# Patient Record
Sex: FEMALE | Race: WHITE | Employment: OTHER | URBAN - METROPOLITAN AREA
[De-identification: names, ages, dates, MRNs, and addresses within clinical notes are randomized per-mention and may not be internally consistent; named-entity substitution may affect disease eponyms.]

---

## 2020-10-01 ENCOUNTER — HOSPITAL ENCOUNTER (OUTPATIENT)
Dept: MAMMOGRAPHY | Age: 60
Discharge: HOME | End: 2020-10-01
Attending: NURSE PRACTITIONER
Payer: COMMERCIAL

## 2020-10-01 DIAGNOSIS — Z12.31 SCREENING MAMMOGRAM, ENCOUNTER FOR: ICD-10-CM

## 2020-10-01 PROCEDURE — 77067 SCR MAMMO BI INCL CAD: CPT

## 2021-04-27 ENCOUNTER — APPOINTMENT (RX ONLY)
Dept: URBAN - METROPOLITAN AREA CLINIC 349 | Facility: CLINIC | Age: 61
Setting detail: DERMATOLOGY
End: 2021-04-27

## 2021-04-27 DIAGNOSIS — Z71.89 OTHER SPECIFIED COUNSELING: ICD-10-CM

## 2021-04-27 DIAGNOSIS — L82.0 INFLAMED SEBORRHEIC KERATOSIS: ICD-10-CM

## 2021-04-27 DIAGNOSIS — L82.1 OTHER SEBORRHEIC KERATOSIS: ICD-10-CM

## 2021-04-27 DIAGNOSIS — Z85.828 PERSONAL HISTORY OF OTHER MALIGNANT NEOPLASM OF SKIN: ICD-10-CM

## 2021-04-27 PROBLEM — D48.5 NEOPLASM OF UNCERTAIN BEHAVIOR OF SKIN: Status: ACTIVE | Noted: 2021-04-27

## 2021-04-27 PROCEDURE — ? COUNSELING

## 2021-04-27 PROCEDURE — 11102 TANGNTL BX SKIN SINGLE LES: CPT

## 2021-04-27 PROCEDURE — ? PATHOLOGY BILLING

## 2021-04-27 PROCEDURE — 88305 TISSUE EXAM BY PATHOLOGIST: CPT

## 2021-04-27 PROCEDURE — A4550 SURGICAL TRAYS: HCPCS

## 2021-04-27 PROCEDURE — 99242 OFF/OP CONSLTJ NEW/EST SF 20: CPT | Mod: 25

## 2021-04-27 PROCEDURE — ? BIOPSY BY SHAVE METHOD

## 2021-04-27 PROCEDURE — ? REFERRAL CORRESPONDENCE

## 2021-04-27 ASSESSMENT — LOCATION ZONE DERM
LOCATION ZONE: FACE
LOCATION ZONE: TRUNK
LOCATION ZONE: FACE
LOCATION ZONE: TRUNK

## 2021-04-27 ASSESSMENT — LOCATION DETAILED DESCRIPTION DERM
LOCATION DETAILED: LEFT INFERIOR UPPER BACK
LOCATION DETAILED: RIGHT CENTRAL MALAR CHEEK
LOCATION DETAILED: SUPERIOR THORACIC SPINE
LOCATION DETAILED: RIGHT CENTRAL MALAR CHEEK

## 2021-04-27 ASSESSMENT — LOCATION SIMPLE DESCRIPTION DERM
LOCATION SIMPLE: RIGHT CHEEK
LOCATION SIMPLE: RIGHT CHEEK
LOCATION SIMPLE: LEFT UPPER BACK
LOCATION SIMPLE: UPPER BACK

## 2021-04-27 NOTE — PROCEDURE: PATHOLOGY BILLING
Immunohistochemistry ([id number] and [id number]) billing is not performed here. Please use the Immunohistochemistry Stain Billing plan to accomplish this. Immunohistochemistry ([id number] and [id number]) billing is not performed here. Please use the Immunohistochemistry Stain Billing plan to accomplish this.

## 2021-04-27 NOTE — PROCEDURE: BIOPSY BY SHAVE METHOD
Validate Triangulation: No
Anesthesia Type: 1% lidocaine with 1:100,000 epinephrine and a 1:10 solution of 8.4% sodium bicarbonate
Dressing: bandage
Curettage Text: The wound bed was treated with curettage after the biopsy was performed.
Depth Of Biopsy: dermis
Biopsy Method: Personna blade
Wound Care: Vaseline
Validate Note Data (See Information Below): Yes
Biopsy Type: H and E
Size Of Lesion In Cm: 0
Additional Anesthesia Volume In Cc (Will Not Render If 0): 1.5
Billing Type: Third-Party Bill
Electrodesiccation Text: The wound bed was treated with electrodesiccation after the biopsy was performed.
Detail Level: Detailed
Consent: Written consent was obtained and risks were reviewed including but not limited to scarring, infection, bleeding, scabbing, incomplete removal, nerve damage and allergy to anesthesia.
Accession #: [id number]
Notification Instructions: Patient will be notified of biopsy results. However, patient instructed to call the office if not contacted within 2 weeks. After the procedure, the patient was oriented to person, place, and time. Patient denied feeling dizzy, queasy, and and declined further observation after initial 5 minute observation time.
Silver Nitrate Text: The wound bed was treated with silver nitrate after the biopsy was performed.
Type Of Destruction Used: Curettage
Post-Care Instructions: I reviewed with the patient in detail post-care instructions. Patient is to keep the biopsy site dry overnight, and then apply Vaseline  daily until healed. Patient may apply hydrogen peroxide soaks to remove any crusting. After the procedure, the patient was oriented to person, place, and time. Patient denied feeling dizzy, queasy, and and declined further observation after initial 5 minute observation time.
Electrodesiccation And Curettage Text: The wound bed was treated with electrodesiccation and curettage after the biopsy was performed.
Cryotherapy Text: The wound bed was treated with cryotherapy after the biopsy was performed.
Hemostasis: Aluminum Chloride
Anesthesia Volume In Cc (Will Not Render If 0): 1
Information: Selecting Yes will display possible errors in your note based on the variables you have selected. This validation is only offered as a suggestion for you. PLEASE NOTE THAT THE VALIDATION TEXT WILL BE REMOVED WHEN YOU FINALIZE YOUR NOTE. IF YOU WANT TO FAX A PRELIMINARY NOTE YOU WILL NEED TO TOGGLE THIS TO 'NO' IF YOU DO NOT WANT IT IN YOUR FAXED NOTE.

## 2021-09-10 ENCOUNTER — TRANSCRIBE ORDER (OUTPATIENT)
Dept: SCHEDULING | Age: 61
End: 2021-09-10

## 2021-09-10 DIAGNOSIS — Z12.31 SCREENING MAMMOGRAM FOR HIGH-RISK PATIENT: Primary | ICD-10-CM

## 2021-10-06 ENCOUNTER — HOSPITAL ENCOUNTER (OUTPATIENT)
Dept: MAMMOGRAPHY | Age: 61
Discharge: HOME | End: 2021-10-06
Attending: NURSE PRACTITIONER
Payer: COMMERCIAL

## 2021-10-06 DIAGNOSIS — Z12.31 SCREENING MAMMOGRAM FOR HIGH-RISK PATIENT: ICD-10-CM

## 2021-10-06 PROCEDURE — 77063 BREAST TOMOSYNTHESIS BI: CPT

## 2022-03-31 PROBLEM — I10 PRIMARY HYPERTENSION: Status: ACTIVE | Noted: 2022-03-31

## 2022-03-31 PROBLEM — E78.5 HYPERLIPIDEMIA: Status: ACTIVE | Noted: 2022-03-31

## 2022-08-25 ENCOUNTER — TELEMEDICINE (OUTPATIENT)
Dept: FAMILY MEDICINE CLINIC | Facility: CLINIC | Age: 62
End: 2022-08-25
Payer: COMMERCIAL

## 2022-08-25 DIAGNOSIS — U07.1 COVID-19: Primary | ICD-10-CM

## 2022-08-25 DIAGNOSIS — I10 ESSENTIAL (PRIMARY) HYPERTENSION: ICD-10-CM

## 2022-08-25 PROCEDURE — 99213 OFFICE O/P EST LOW 20 MIN: CPT | Performed by: FAMILY MEDICINE

## 2022-08-25 RX ORDER — HYDROCHLOROTHIAZIDE 25 MG/1
TABLET ORAL
Qty: 30 TABLET | Refills: 11 | Status: SHIPPED | OUTPATIENT
Start: 2022-08-25 | End: 2022-09-30 | Stop reason: SDUPTHER

## 2022-08-25 ASSESSMENT — ENCOUNTER SYMPTOMS
CONSTIPATION: 0
ABDOMINAL PAIN: 0
DIARRHEA: 0
SHORTNESS OF BREATH: 0
EYE DISCHARGE: 0
CHEST TIGHTNESS: 0
COUGH: 0
SINUS PAIN: 0

## 2022-08-25 NOTE — PROGRESS NOTES
2022    TELEHEALTH EVALUATION -- Audio/Visual (During QYEGO-75 public health emergency)    HPI:    Ismael Burt (:  1960) has requested an audio/video evaluation for the following concern(s):    Pt with URI sxs since yesterday. Covid positive last night. Pt with cough, congestion and sinus pressure. No colored drainage. T-102. HA. BP has been doing well at home. No CP or SOB. No headaches or edema. No side effects with medications. Exercising regularly. Review of Systems   Constitutional:  Negative for appetite change, fatigue and fever. HENT:  Negative for congestion, ear pain and sinus pain. Eyes:  Negative for discharge. Respiratory:  Negative for cough, chest tightness and shortness of breath. Cardiovascular:  Negative for chest pain, palpitations and leg swelling. Gastrointestinal:  Negative for abdominal pain, constipation and diarrhea. Genitourinary:  Negative for dysuria. Musculoskeletal:  Negative for joint swelling. Skin:  Negative for rash. Neurological:  Negative for headaches. Hematological:  Negative for adenopathy. Psychiatric/Behavioral:  Negative for dysphoric mood. The patient is not nervous/anxious. Prior to Visit Medications    Medication Sig Taking? Authorizing Provider   nirmatrelvir/ritonavir (PAXLOVID) 20 x 150 MG & 10 x 100MG TBPK Take 3 tablets (two 150 mg nirmatrelvir and one 100 mg ritonavir tablets) by mouth every 12 hours for 5 days. Yes Idalia Rivera MD   hydroCHLOROthiazide (HYDRODIURIL) 25 MG tablet TAKE 1 TABLET BY MOUTH EVERY DAY  Idalia Rivera MD   simvastatin (ZOCOR) 10 MG tablet TAKE 1 TABLET BY MOUTH EVERYDAY AT BEDTIME  Ar Automatic Reconciliation       Social History     Tobacco Use    Smoking status: Never    Smokeless tobacco: Never   Substance Use Topics    Alcohol use:  Yes     Alcohol/week: 1.0 standard drink        Allergies   Allergen Reactions    Sulfa Antibiotics Rash   ,   Past Medical History: Diagnosis Date    Hypercholesterolemia     Hypertension    ,   Past Surgical History:   Procedure Laterality Date    GYN      C-sect--2   ,   Social History     Tobacco Use    Smoking status: Never    Smokeless tobacco: Never   Substance Use Topics    Alcohol use: Yes     Alcohol/week: 1.0 standard drink   ,   Family History   Problem Relation Age of Onset    Breast Cancer Maternal Aunt         MGAunt    Cancer Father         bone cancer    Cancer Mother         pancreatic    Diabetes Maternal Grandfather        PHYSICAL EXAMINATION:  [ INSTRUCTIONS:  \"[x]\" Indicates a positive item  \"[]\" Indicates a negative item  -- DELETE ALL ITEMS NOT EXAMINED]  Vital Signs: (As obtained by patient/caregiver or practitioner observation)    Blood pressure-  Heart rate-    Respiratory rate-    Temperature-  Pulse oximetry-     Constitutional: [x] Appears well-developed and well-nourished [] No apparent distress      [] Abnormal-   Mental status  [x] Alert and awake  [x] Oriented to person/place/time []Able to follow commands      Eyes:  EOM    [x]  Normal  [] Abnormal-  Sclera  [x]  Normal  [] Abnormal -         Discharge []  None visible  [] Abnormal -    HENT:   [x] Normocephalic, atraumatic.   [] Abnormal   [] Mouth/Throat: Mucous membranes are moist.     External Ears [x] Normal  [] Abnormal-     Neck: [x] No visualized mass     Pulmonary/Chest: [x] Respiratory effort normal.  [x] No visualized signs of difficulty breathing or respiratory distress        [] Abnormal-      Musculoskeletal:   [] Normal gait with no signs of ataxia         [] Normal range of motion of neck        [] Abnormal-       Neurological:        [x] No Facial Asymmetry (Cranial nerve 7 motor function) (limited exam to video visit)          [] No gaze palsy        [] Abnormal-         Skin:        [x] No significant exanthematous lesions or discoloration noted on facial skin         [] Abnormal-            Psychiatric:       [x] Normal Affect [] No Hallucinations        [] Abnormal-     Other pertinent observable physical exam findings-     ASSESSMENT/PLAN:  1. COVID-19  Covid precautions  Watch O2 sat  Call for worsening  - nirmatrelvir/ritonavir (PAXLOVID) 20 x 150 MG & 10 x 100MG TBPK; Take 3 tablets (two 150 mg nirmatrelvir and one 100 mg ritonavir tablets) by mouth every 12 hours for 5 days. Dispense: 30 tablet; Refill: 0    2. Essential (primary) hypertension  HCTZ refilled; watch BP  Hold statin on Paxlovid    No follow-ups on file. [facility], was evaluated through a synchronous (real-time) audio-video encounter. The patient (or guardian if applicable) is aware that this is a billable service, which includes applicable co-pays. This Virtual Visit was conducted with patient's (and/or legal guardian's) consent. The visit was conducted pursuant to the emergency declaration under the [address] [place] [address] authority and the [employer] General Act. Patient identification was verified, and a caregiver was present when appropriate. The patient was located at Home: [address] [place] [address]. Provider was located at [facility] (Appt Dept): [address] [facility] ([address]. Total time spent on this encounter:  20min    --Xavier Whitehead MD on 8/25/2022 at 4:15 PM    An electronic signature was used to authenticate this note.

## 2022-09-20 ENCOUNTER — TRANSCRIBE ORDERS (OUTPATIENT)
Dept: SCHEDULING | Age: 62
End: 2022-09-20

## 2022-09-20 DIAGNOSIS — Z12.31 VISIT FOR SCREENING MAMMOGRAM: Primary | ICD-10-CM

## 2022-09-23 DIAGNOSIS — I10 PRIMARY HYPERTENSION: Primary | ICD-10-CM

## 2022-09-23 DIAGNOSIS — E78.5 HYPERLIPIDEMIA, UNSPECIFIED HYPERLIPIDEMIA TYPE: ICD-10-CM

## 2022-09-26 ENCOUNTER — NURSE ONLY (OUTPATIENT)
Dept: FAMILY MEDICINE CLINIC | Facility: CLINIC | Age: 62
End: 2022-09-26

## 2022-09-26 DIAGNOSIS — I10 PRIMARY HYPERTENSION: ICD-10-CM

## 2022-09-26 DIAGNOSIS — E78.5 HYPERLIPIDEMIA, UNSPECIFIED HYPERLIPIDEMIA TYPE: ICD-10-CM

## 2022-09-26 LAB
ERYTHROCYTE [DISTWIDTH] IN BLOOD BY AUTOMATED COUNT: 14.6 % (ref 11.9–14.6)
HCT VFR BLD AUTO: 45.8 % (ref 35.8–46.3)
HGB BLD-MCNC: 14.8 G/DL (ref 11.7–15.4)
MCH RBC QN AUTO: 29.2 PG (ref 26.1–32.9)
MCHC RBC AUTO-ENTMCNC: 32.3 G/DL (ref 31.4–35)
MCV RBC AUTO: 90.3 FL (ref 79.6–97.8)
NRBC # BLD: 0 K/UL (ref 0–0.2)
PLATELET # BLD AUTO: 193 K/UL (ref 150–450)
PMV BLD AUTO: 11 FL (ref 9.4–12.3)
RBC # BLD AUTO: 5.07 M/UL (ref 4.05–5.2)
WBC # BLD AUTO: 5 K/UL (ref 4.3–11.1)

## 2022-09-27 LAB
ALBUMIN SERPL-MCNC: 3.8 G/DL (ref 3.2–4.6)
ALBUMIN/GLOB SERPL: 1.1 {RATIO} (ref 1.2–3.5)
ALP SERPL-CCNC: 58 U/L (ref 50–136)
ALT SERPL-CCNC: 23 U/L (ref 12–65)
ANION GAP SERPL CALC-SCNC: 0 MMOL/L (ref 4–13)
APPEARANCE UR: CLEAR
AST SERPL-CCNC: 21 U/L (ref 15–37)
BACTERIA URNS QL MICRO: NEGATIVE /HPF
BILIRUB SERPL-MCNC: 0.5 MG/DL (ref 0.2–1.1)
BILIRUB UR QL: NEGATIVE
BUN SERPL-MCNC: 21 MG/DL (ref 8–23)
CALCIUM SERPL-MCNC: 9.8 MG/DL (ref 8.3–10.4)
CASTS URNS QL MICRO: ABNORMAL /LPF
CHLORIDE SERPL-SCNC: 106 MMOL/L (ref 101–110)
CHOLEST SERPL-MCNC: 187 MG/DL
CO2 SERPL-SCNC: 28 MMOL/L (ref 21–32)
COLOR UR: ABNORMAL
CREAT SERPL-MCNC: 0.7 MG/DL (ref 0.6–1)
EPI CELLS #/AREA URNS HPF: ABNORMAL /HPF
GLOBULIN SER CALC-MCNC: 3.6 G/DL (ref 2.3–3.5)
GLUCOSE SERPL-MCNC: 99 MG/DL (ref 65–100)
GLUCOSE UR STRIP.AUTO-MCNC: NEGATIVE MG/DL
HDLC SERPL-MCNC: 60 MG/DL (ref 40–60)
HDLC SERPL: 3.1 {RATIO}
HGB UR QL STRIP: NEGATIVE
KETONES UR QL STRIP.AUTO: NEGATIVE MG/DL
LDLC SERPL CALC-MCNC: 103.8 MG/DL
LEUKOCYTE ESTERASE UR QL STRIP.AUTO: ABNORMAL
MUCOUS THREADS URNS QL MICRO: 0 /LPF
NITRITE UR QL STRIP.AUTO: NEGATIVE
PH UR STRIP: 7 [PH] (ref 5–9)
POTASSIUM SERPL-SCNC: 3.7 MMOL/L (ref 3.5–5.1)
PROT SERPL-MCNC: 7.4 G/DL (ref 6.3–8.2)
PROT UR STRIP-MCNC: ABNORMAL MG/DL
RBC #/AREA URNS HPF: ABNORMAL /HPF
SODIUM SERPL-SCNC: 134 MMOL/L (ref 136–145)
SP GR UR REFRACTOMETRY: 1.03 (ref 1–1.02)
TRIGL SERPL-MCNC: 116 MG/DL (ref 35–150)
TSH, 3RD GENERATION: 4.75 UIU/ML (ref 0.36–3.74)
URINE CULTURE IF INDICATED: ABNORMAL
UROBILINOGEN UR QL STRIP.AUTO: 0.2 EU/DL (ref 0.2–1)
VLDLC SERPL CALC-MCNC: 23.2 MG/DL (ref 6–23)
WBC URNS QL MICRO: ABNORMAL /HPF

## 2022-09-30 ENCOUNTER — OFFICE VISIT (OUTPATIENT)
Dept: FAMILY MEDICINE CLINIC | Facility: CLINIC | Age: 62
End: 2022-09-30
Payer: COMMERCIAL

## 2022-09-30 VITALS
DIASTOLIC BLOOD PRESSURE: 82 MMHG | WEIGHT: 150 LBS | HEIGHT: 60 IN | HEART RATE: 73 BPM | SYSTOLIC BLOOD PRESSURE: 112 MMHG | OXYGEN SATURATION: 99 % | BODY MASS INDEX: 29.45 KG/M2

## 2022-09-30 DIAGNOSIS — R87.619 UNSPECIFIED ABNORMAL CYTOLOGICAL FINDINGS IN SPECIMENS FROM CERVIX UTERI: ICD-10-CM

## 2022-09-30 DIAGNOSIS — I10 ESSENTIAL (PRIMARY) HYPERTENSION: ICD-10-CM

## 2022-09-30 DIAGNOSIS — E78.2 MIXED HYPERLIPIDEMIA: ICD-10-CM

## 2022-09-30 DIAGNOSIS — R41.3 MEMORY LOSS: ICD-10-CM

## 2022-09-30 DIAGNOSIS — E03.9 ACQUIRED HYPOTHYROIDISM: ICD-10-CM

## 2022-09-30 DIAGNOSIS — Z00.00 PREVENTATIVE HEALTH CARE: Primary | ICD-10-CM

## 2022-09-30 DIAGNOSIS — F51.01 PRIMARY INSOMNIA: ICD-10-CM

## 2022-09-30 PROCEDURE — 99396 PREV VISIT EST AGE 40-64: CPT | Performed by: FAMILY MEDICINE

## 2022-09-30 RX ORDER — HYDROCHLOROTHIAZIDE 25 MG/1
TABLET ORAL
Qty: 90 TABLET | Refills: 3 | Status: SHIPPED | OUTPATIENT
Start: 2022-09-30

## 2022-09-30 RX ORDER — LEVOTHYROXINE SODIUM 0.05 MG/1
50 TABLET ORAL DAILY
Qty: 30 TABLET | Refills: 5 | Status: SHIPPED | OUTPATIENT
Start: 2022-09-30

## 2022-09-30 RX ORDER — LORAZEPAM 0.5 MG/1
0.5 TABLET ORAL NIGHTLY PRN
Qty: 30 TABLET | Refills: 2 | Status: SHIPPED | OUTPATIENT
Start: 2022-09-30 | End: 2022-10-30

## 2022-09-30 ASSESSMENT — PATIENT HEALTH QUESTIONNAIRE - PHQ9
1. LITTLE INTEREST OR PLEASURE IN DOING THINGS: 0
SUM OF ALL RESPONSES TO PHQ QUESTIONS 1-9: 0
2. FEELING DOWN, DEPRESSED OR HOPELESS: 0
SUM OF ALL RESPONSES TO PHQ9 QUESTIONS 1 & 2: 0

## 2022-09-30 ASSESSMENT — ENCOUNTER SYMPTOMS
EYE DISCHARGE: 0
CONSTIPATION: 0
CHEST TIGHTNESS: 0
ABDOMINAL PAIN: 0
SINUS PAIN: 0
SHORTNESS OF BREATH: 0
DIARRHEA: 0
COUGH: 0

## 2022-09-30 NOTE — PROGRESS NOTES
Gloria Day is a 57 y.o. female who presents with   Chief Complaint   Patient presents with    Annual Exam     Last PAP 3/31/22, Mammogram scheduled for 10/12/22, last colonoscopy 2019. Memory Loss     Short memory loss in the last 6 months    Gynecologic Exam       History of Present Illness  Here for cpx. Has noticed some short term memory issues. Parents  young with cancer. No depression or increased anxiety. Does not sleep well. Awakens frequently. TSH was mildly elevated. FBS was 99. No cp or sob. No gi sxs. Colonoscopy normal 3 years ago in [place]. Review of Systems  Review of Systems   Constitutional:  Negative for appetite change, fatigue and fever. HENT:  Negative for congestion, ear pain and sinus pain. Eyes:  Negative for discharge. Respiratory:  Negative for cough, chest tightness and shortness of breath. Cardiovascular:  Negative for chest pain, palpitations and leg swelling. Gastrointestinal:  Negative for abdominal pain, constipation and diarrhea. Genitourinary:  Negative for dysuria. Musculoskeletal:  Negative for joint swelling. Skin:  Negative for rash. Neurological:  Negative for headaches. Memory issues   Hematological:  Negative for adenopathy. Psychiatric/Behavioral:  Negative for dysphoric mood. The patient is not nervous/anxious. Medications  Current Outpatient Medications   Medication Sig Dispense Refill    hydroCHLOROthiazide (HYDRODIURIL) 25 MG tablet TAKE 1 TABLET BY MOUTH EVERY DAY 90 tablet 3    LORazepam (ATIVAN) 0.5 MG tablet Take 1 tablet by mouth nightly as needed for Anxiety for up to 30 days. 30 tablet 2    levothyroxine (SYNTHROID) 50 MCG tablet Take 1 tablet by mouth daily 30 tablet 5    simvastatin (ZOCOR) 10 MG tablet TAKE 1 TABLET BY MOUTH EVERYDAY AT BEDTIME       No current facility-administered medications for this visit.         Past Medical History  Past Medical History:   Diagnosis Date    Hypercholesterolemia Hypertension        Surgical History  Past Surgical History:   Procedure Laterality Date    GYN      C-sect--2          Family History  Family History   Problem Relation Age of Onset    Breast Cancer Maternal Aunt         MGAunt    Cancer Father         bone cancer    Cancer Mother         pancreatic    Diabetes Maternal Grandfather        Social History  Social History     Socioeconomic History    Marital status:      Spouse name: Not on file    Number of children: Not on file    Years of education: Not on file    Highest education level: Not on file   Occupational History    Not on file   Tobacco Use    Smoking status: Never    Smokeless tobacco: Never   Substance and Sexual Activity    Alcohol use: Yes     Alcohol/week: 1.0 standard drink    Drug use: Not on file    Sexual activity: Not on file   Other Topics Concern    Not on file   Social History Narrative    Not on file     Social Determinants of Health     Financial Resource Strain: Not on file   Food Insecurity: Not on file   Transportation Needs: Not on file   Physical Activity: Not on file   Stress: Not on file   Social Connections: Not on file   Intimate Partner Violence: Not on file   Housing Stability: Not on file       Social History     Tobacco Use   Smoking Status Never   Smokeless Tobacco Never       Allergies  Allergies   Allergen Reactions    Sulfa Antibiotics Rash       Vital Signs  Body mass index is 29.29 kg/m². Vitals:    09/30/22 0917   BP: 112/82   Site: Left Upper Arm   Position: Sitting   Cuff Size: Medium Adult   Pulse: 73   SpO2: 99%   Weight: 150 lb (68 kg)   Height: 5' (1.524 m)       Physical Exam  Physical Exam  Vitals reviewed. Constitutional:       Appearance: Normal appearance. HENT:      Head: Normocephalic. Right Ear: Tympanic membrane normal.      Left Ear: Tympanic membrane normal.      Nose: No congestion. Mouth/Throat:      Pharynx: No oropharyngeal exudate or posterior oropharyngeal erythema.    Eyes: Extraocular Movements: Extraocular movements intact. Conjunctiva/sclera: Conjunctivae normal.      Pupils: Pupils are equal, round, and reactive to light. Neck:      Vascular: No carotid bruit. Cardiovascular:      Rate and Rhythm: Normal rate and regular rhythm. Pulses: Normal pulses. Heart sounds: No murmur heard. Pulmonary:      Effort: Pulmonary effort is normal.      Breath sounds: Normal breath sounds. No wheezing. Chest:   Breasts:     Breasts are symmetrical.      Right: No swelling, bleeding, inverted nipple, mass, nipple discharge, skin change or tenderness. Left: No swelling, bleeding, inverted nipple, mass, nipple discharge, skin change or tenderness. Abdominal:      General: Bowel sounds are normal. There is no distension. Palpations: Abdomen is soft. There is no mass. Tenderness: There is no abdominal tenderness. Hernia: No hernia is present. Genitourinary:     General: Normal vulva. Vagina: No vaginal discharge. Cervix: Normal.      Uterus: Normal.       Adnexa: Right adnexa normal and left adnexa normal.      Rectum: Normal. Guaiac result negative. Musculoskeletal:         General: No tenderness. Cervical back: Normal range of motion. Right lower leg: No edema. Left lower leg: No edema. Lymphadenopathy:      Cervical: No cervical adenopathy. Skin:     General: Skin is warm and dry. Findings: No rash. Neurological:      General: No focal deficit present. Mental Status: She is alert and oriented to person, place, and time. Psychiatric:         Mood and Affect: Mood normal.         Thought Content:  Thought content normal.        Assessment and Plan  Jelena Davidson was seen today for annual exam, memory loss and gynecologic exam.    Diagnoses and all orders for this visit:    Preventative health care    Essential (primary) hypertension  -     hydroCHLOROthiazide (HYDRODIURIL) 25 MG tablet; TAKE 1 TABLET BY MOUTH EVERY DAY    Memory loss    Mixed hyperlipidemia    Unspecified abnormal cytological findings in specimens from cervix uteri    Primary insomnia  -     LORazepam (ATIVAN) 0.5 MG tablet; Take 1 tablet by mouth nightly as needed for Anxiety for up to 30 days. Acquired hypothyroidism  -     levothyroxine (SYNTHROID) 50 MCG tablet; Take 1 tablet by mouth daily  Hold Zocor; add Synthroid; add prn Ativan for sleep  Recheck 6 weeks  Diet/exercise  FBS improved  Ag given    On this date I have spent 45 minutes reviewing previous notes, test results and face to face with the patient discussing the diagnosis and importance of compliance with the treatment plan as well as documenting on the day of the visit.

## 2022-10-06 LAB
CYTOLOGIST CVX/VAG CYTO: NORMAL
CYTOLOGY CVX/VAG DOC THIN PREP: NORMAL
HPV APTIMA: NEGATIVE
Lab: NORMAL
Lab: NORMAL
PATH REPORT.FINAL DX SPEC: NORMAL
STAT OF ADQ CVX/VAG CYTO-IMP: NORMAL

## 2022-10-12 ENCOUNTER — HOSPITAL ENCOUNTER (OUTPATIENT)
Dept: MAMMOGRAPHY | Age: 62
Discharge: HOME | End: 2022-10-15
Payer: COMMERCIAL

## 2022-10-12 DIAGNOSIS — Z12.31 VISIT FOR SCREENING MAMMOGRAM: ICD-10-CM

## 2022-10-12 PROCEDURE — 77063 BREAST TOMOSYNTHESIS BI: CPT

## 2022-11-15 ENCOUNTER — OFFICE VISIT (OUTPATIENT)
Dept: FAMILY MEDICINE CLINIC | Facility: CLINIC | Age: 62
End: 2022-11-15
Payer: COMMERCIAL

## 2022-11-15 VITALS
HEART RATE: 66 BPM | DIASTOLIC BLOOD PRESSURE: 68 MMHG | TEMPERATURE: 98 F | BODY MASS INDEX: 29.57 KG/M2 | HEIGHT: 60 IN | SYSTOLIC BLOOD PRESSURE: 126 MMHG | OXYGEN SATURATION: 99 % | WEIGHT: 150.6 LBS

## 2022-11-15 DIAGNOSIS — R41.3 MEMORY LOSS: Primary | ICD-10-CM

## 2022-11-15 DIAGNOSIS — E78.5 HYPERLIPIDEMIA, UNSPECIFIED HYPERLIPIDEMIA TYPE: ICD-10-CM

## 2022-11-15 DIAGNOSIS — I10 ESSENTIAL (PRIMARY) HYPERTENSION: ICD-10-CM

## 2022-11-15 DIAGNOSIS — F51.01 PRIMARY INSOMNIA: ICD-10-CM

## 2022-11-15 DIAGNOSIS — E03.9 ACQUIRED HYPOTHYROIDISM: ICD-10-CM

## 2022-11-15 PROCEDURE — 99214 OFFICE O/P EST MOD 30 MIN: CPT | Performed by: FAMILY MEDICINE

## 2022-11-15 PROCEDURE — 3078F DIAST BP <80 MM HG: CPT | Performed by: FAMILY MEDICINE

## 2022-11-15 PROCEDURE — 3074F SYST BP LT 130 MM HG: CPT | Performed by: FAMILY MEDICINE

## 2022-11-15 RX ORDER — LORAZEPAM 0.5 MG/1
0.5 TABLET ORAL NIGHTLY PRN
Qty: 30 TABLET | Refills: 2 | Status: SHIPPED | OUTPATIENT
Start: 2022-11-15 | End: 2022-12-15

## 2022-11-15 RX ORDER — LORAZEPAM 0.5 MG/1
0.5 TABLET ORAL EVERY 6 HOURS PRN
COMMUNITY

## 2022-11-15 ASSESSMENT — PATIENT HEALTH QUESTIONNAIRE - PHQ9
SUM OF ALL RESPONSES TO PHQ QUESTIONS 1-9: 0
1. LITTLE INTEREST OR PLEASURE IN DOING THINGS: 0
SUM OF ALL RESPONSES TO PHQ9 QUESTIONS 1 & 2: 0
SUM OF ALL RESPONSES TO PHQ QUESTIONS 1-9: 0
SUM OF ALL RESPONSES TO PHQ QUESTIONS 1-9: 0
2. FEELING DOWN, DEPRESSED OR HOPELESS: 0
SUM OF ALL RESPONSES TO PHQ QUESTIONS 1-9: 0

## 2022-11-15 ASSESSMENT — ENCOUNTER SYMPTOMS
ABDOMINAL PAIN: 0
COUGH: 0
SHORTNESS OF BREATH: 0
SINUS PAIN: 0
CONSTIPATION: 0
DIARRHEA: 0
CHEST TIGHTNESS: 0
EYE DISCHARGE: 0

## 2022-11-15 NOTE — PROGRESS NOTES
Johanna Medellin is a 57 y.o. female who presents with   Chief Complaint   Patient presents with    Insomnia     Started prn Ativan at [facility], helps with sleep. Still wakes during the night but finds it easier to go back to sleep. History of Present Illness  Here for recheck insomnia, thyroid, memory loss. Just started thyroid medication. Here for recheck of thyroid. No temperature intolerance. No palpitations. No skin or hair changes. No increased fatigue. No jitteriness. Mood good. Memory issues persist.  Holding Zocor, starting Synthroid and getting sleep have not helped significantly. Not depressed or having anxiety. Holding Zocor did not impact memory. Forgetting conversations. Has not gotten lost. Trouble with names. Making lists to help. Review of Systems  Review of Systems   Constitutional:  Negative for appetite change, fatigue and fever. HENT:  Negative for congestion, ear pain and sinus pain. Eyes:  Negative for discharge. Respiratory:  Negative for cough, chest tightness and shortness of breath. Cardiovascular:  Negative for chest pain, palpitations and leg swelling. Gastrointestinal:  Negative for abdominal pain, constipation and diarrhea. Genitourinary:  Negative for dysuria. Musculoskeletal:  Negative for joint swelling. Skin:  Negative for rash. Neurological:  Negative for headaches. Hematological:  Negative for adenopathy. Psychiatric/Behavioral:  Positive for sleep disturbance (improved). Negative for dysphoric mood. The patient is not nervous/anxious. Medications  Current Outpatient Medications   Medication Sig Dispense Refill    LORazepam (ATIVAN) 0.5 MG tablet Take 0.5 mg by mouth every 6 hours as needed for Anxiety. LORazepam (ATIVAN) 0.5 MG tablet Take 1 tablet by mouth nightly as needed for Anxiety for up to 30 days.  30 tablet 2    hydroCHLOROthiazide (HYDRODIURIL) 25 MG tablet TAKE 1 TABLET BY MOUTH EVERY DAY 90 tablet 3    levothyroxine (SYNTHROID) 50 MCG tablet Take 1 tablet by mouth daily 30 tablet 5     No current facility-administered medications for this visit. Past Medical History  Past Medical History:   Diagnosis Date    Hypercholesterolemia     Hypertension        Surgical History  Past Surgical History:   Procedure Laterality Date    GYN      C-sect--2          Family History  Family History   Problem Relation Age of Onset    Breast Cancer Maternal Aunt         MGAunt    Cancer Father         bone cancer    Cancer Mother         pancreatic    Diabetes Maternal Grandfather        Social History  Social History     Socioeconomic History    Marital status:      Spouse name: Not on file    Number of children: Not on file    Years of education: Not on file    Highest education level: Not on file   Occupational History    Not on file   Tobacco Use    Smoking status: Never    Smokeless tobacco: Never   Vaping Use    Vaping Use: Never used   Substance and Sexual Activity    Alcohol use: Yes     Alcohol/week: 1.0 standard drink    Drug use: Never    Sexual activity: Not on file   Other Topics Concern    Not on file   Social History Narrative    Not on file     Social Determinants of Health     Financial Resource Strain: Not on file   Food Insecurity: Not on file   Transportation Needs: Not on file   Physical Activity: Not on file   Stress: Not on file   Social Connections: Not on file   Intimate Partner Violence: Not on file   Housing Stability: Not on file       Social History     Tobacco Use   Smoking Status Never   Smokeless Tobacco Never       Allergies  Allergies   Allergen Reactions    Sulfa Antibiotics Rash       Vital Signs  Body mass index is 29.41 kg/m².   Vitals:    11/15/22 0916   BP: 126/68   Site: Left Upper Arm   Position: Sitting   Cuff Size: Large Adult   Pulse: 66   Temp: 98 °F (36.7 °C)   TempSrc: Temporal   SpO2: 99%   Weight: 150 lb 9.6 oz (68.3 kg)   Height: 5' (1.524 m)       Physical Exam  Physical Exam  Vitals reviewed. Constitutional:       Appearance: Normal appearance. HENT:      Head: Normocephalic. Right Ear: Tympanic membrane normal.      Left Ear: Tympanic membrane normal.      Nose: No congestion. Mouth/Throat:      Pharynx: No oropharyngeal exudate or posterior oropharyngeal erythema. Eyes:      Extraocular Movements: Extraocular movements intact. Conjunctiva/sclera: Conjunctivae normal.   Cardiovascular:      Rate and Rhythm: Normal rate and regular rhythm. Heart sounds: Normal heart sounds. No murmur heard. Pulmonary:      Effort: Pulmonary effort is normal.      Breath sounds: Normal breath sounds. No wheezing. Musculoskeletal:         General: No tenderness. Right lower leg: No edema. Left lower leg: No edema. Lymphadenopathy:      Cervical: No cervical adenopathy. Skin:     General: Skin is warm and dry. Findings: No rash. Neurological:      General: No focal deficit present. Mental Status: She is alert. Psychiatric:         Mood and Affect: Mood normal.         Thought Content: Thought content normal.        Assessment and Plan  Alo Cabrera was seen today for insomnia. Diagnoses and all orders for this visit:    Memory loss  -     Les Villafana DO, Neurology, [facility]    Primary insomnia  -     LORazepam (ATIVAN) 0.5 MG tablet; Take 1 tablet by mouth nightly as needed for Anxiety for up to 30 days. Essential (primary) hypertension    Acquired hypothyroidism  -     TSH; Future  -     T4, Free; Future  -     T4, Free  -     TSH  Recheck TSH/T4  Restart Zocor  Sleeping better-  Use Ativan just prn for sleep      On this date I have spent 30 minutes reviewing previous notes, test results and face to face with the patient discussing the diagnosis and importance of compliance with the treatment plan as well as documenting on the day of the visit.

## 2022-11-16 LAB
T4 FREE SERPL-MCNC: 1.3 NG/DL (ref 0.78–1.46)
TSH, 3RD GENERATION: 1.26 UIU/ML (ref 0.36–3.74)

## 2023-01-06 ENCOUNTER — TELEPHONE (OUTPATIENT)
Dept: FAMILY MEDICINE CLINIC | Facility: CLINIC | Age: 63
End: 2023-01-06

## 2023-01-06 RX ORDER — SIMVASTATIN 10 MG
10 TABLET ORAL NIGHTLY
Qty: 90 TABLET | Refills: 3 | Status: SHIPPED | OUTPATIENT
Start: 2023-01-06

## 2023-03-28 DIAGNOSIS — E03.9 ACQUIRED HYPOTHYROIDISM: ICD-10-CM

## 2023-03-28 RX ORDER — LEVOTHYROXINE SODIUM 0.05 MG/1
50 TABLET ORAL DAILY
Qty: 30 TABLET | Refills: 1 | Status: SHIPPED | OUTPATIENT
Start: 2023-03-28

## 2023-03-28 NOTE — TELEPHONE ENCOUNTER
LOV 11/15/22, last TSH 11/15/22, last rxed 9/30/22 #30 and 5rf. Per LOV notes, pt is due for OV in mid-May. Erxed refills to preferred pharmacy. OV and labs required for further refills.

## 2023-04-18 ENCOUNTER — TELEPHONE (OUTPATIENT)
Dept: NEUROLOGY | Age: 63
End: 2023-04-18

## 2023-04-19 ENCOUNTER — OFFICE VISIT (OUTPATIENT)
Dept: NEUROLOGY | Age: 63
End: 2023-04-19

## 2023-04-19 DIAGNOSIS — R41.82 ALTERED MENTAL STATUS, UNSPECIFIED ALTERED MENTAL STATUS TYPE: Primary | ICD-10-CM

## 2023-04-19 DIAGNOSIS — R41.3 MEMORY DIFFICULTIES: ICD-10-CM

## 2023-04-19 NOTE — PROGRESS NOTES
[facility], [address], [facility]    Routine Electroencephalogram Report      DATE: 2023    EEG Number:      Indication:  AMS/ memory changes    Medications:   Current Outpatient Medications   Medication Sig Dispense Refill    levothyroxine (SYNTHROID) 50 MCG tablet Take 1 tablet by mouth daily 30 tablet 1    simvastatin (ZOCOR) 10 MG tablet Take 1 tablet by mouth nightly TAKE 1 TABLET BY MOUTH EVERYDAY AT BEDTIME 90 tablet 3    LORazepam (ATIVAN) 0.5 MG tablet Take 1 tablet by mouth every 6 hours as needed for Anxiety. hydroCHLOROthiazide (HYDRODIURIL) 25 MG tablet TAKE 1 TABLET BY MOUTH EVERY DAY 90 tablet 3     No current facility-administered medications for this visit. Technique: This EEG was performed using the [employer] 10/20 System. An EKG was monitored. The length of the recording was 30 minutes. State of Consciousness: awake, drowsy, and asleep      Description:  Background showed normal alpha rhythm 8 Hz and symmetrical.  Beta 20 to 30 Hz, low-voltage, diffuse and symmetrical.  Abundant drowsy periods, non-REM sleep stage I with normal potentials. No epileptic spikes or sharp waves, no rhythmic activities. Activation Procedures:  Hyperventilation: deferred due to age  Photic Stimulation: Symmetrical driving    EK BPM, regular  Oxymetry: 91 to 99%      Interpretation: Normal electroencephalogram, awake, asleep and with activation procedure. There are no epileptiform discharges or pathological slowing activities.   EKG monitoring and oximetry are normal.      Moshe Campos MD

## 2023-04-20 ENCOUNTER — HOSPITAL ENCOUNTER (OUTPATIENT)
Dept: MRI IMAGING | Age: 63
Discharge: HOME | End: 2023-04-20
Payer: MEDICARE

## 2023-04-20 DIAGNOSIS — R41.3 MEMORY DIFFICULTIES: ICD-10-CM

## 2023-04-20 LAB — VIT B12 SERPL-MCNC: 374 PG/ML (ref 193–986)

## 2023-04-20 PROCEDURE — A9579 GAD-BASE MR CONTRAST NOS,1ML: HCPCS | Performed by: PSYCHIATRY & NEUROLOGY

## 2023-04-20 PROCEDURE — 2580000003 HC RX 258: Performed by: PSYCHIATRY & NEUROLOGY

## 2023-04-20 PROCEDURE — 70553 MRI BRAIN STEM W/O & W/DYE: CPT

## 2023-04-20 PROCEDURE — 6360000004 HC RX CONTRAST MEDICATION: Performed by: PSYCHIATRY & NEUROLOGY

## 2023-04-20 RX ORDER — SODIUM CHLORIDE 0.9 % (FLUSH) 0.9 %
10 SYRINGE (ML) INJECTION AS NEEDED
Status: DISCONTINUED | OUTPATIENT
Start: 2023-04-20 | End: 2023-04-24 | Stop reason: HOSPADM

## 2023-04-20 RX ADMIN — GADOTERIDOL 13 ML: 279.3 INJECTION, SOLUTION INTRAVENOUS at 06:59

## 2023-04-20 RX ADMIN — SODIUM CHLORIDE, PRESERVATIVE FREE 10 ML: 5 INJECTION INTRAVENOUS at 07:00

## 2023-04-21 DIAGNOSIS — I69.319 COGNITIVE DYSFUNCTION DUE TO OLD STROKE: Primary | ICD-10-CM

## 2023-05-02 ENCOUNTER — HOSPITAL ENCOUNTER (OUTPATIENT)
Dept: CT IMAGING | Age: 63
Discharge: HOME | End: 2023-05-05
Payer: MEDICARE

## 2023-05-02 DIAGNOSIS — I69.319 COGNITIVE DYSFUNCTION DUE TO OLD STROKE: ICD-10-CM

## 2023-05-02 PROCEDURE — 2580000003 HC RX 258: Performed by: PSYCHIATRY & NEUROLOGY

## 2023-05-02 PROCEDURE — 70496 CT ANGIOGRAPHY HEAD: CPT

## 2023-05-02 PROCEDURE — 6360000004 HC RX CONTRAST MEDICATION: Performed by: PSYCHIATRY & NEUROLOGY

## 2023-05-02 RX ORDER — 0.9 % SODIUM CHLORIDE 0.9 %
100 INTRAVENOUS SOLUTION INTRAVENOUS
Status: COMPLETED | OUTPATIENT
Start: 2023-05-02 | End: 2023-05-02

## 2023-05-02 RX ORDER — SODIUM CHLORIDE 0.9 % (FLUSH) 0.9 %
10 SYRINGE (ML) INJECTION
Status: COMPLETED | OUTPATIENT
Start: 2023-05-02 | End: 2023-05-02

## 2023-05-02 RX ADMIN — SODIUM CHLORIDE 100 ML: 9 INJECTION, SOLUTION INTRAVENOUS at 14:40

## 2023-05-02 RX ADMIN — SODIUM CHLORIDE, PRESERVATIVE FREE 10 ML: 5 INJECTION INTRAVENOUS at 14:41

## 2023-05-02 RX ADMIN — IOPAMIDOL 100 ML: 755 INJECTION, SOLUTION INTRAVENOUS at 14:40

## 2023-06-02 ENCOUNTER — TELEPHONE (OUTPATIENT)
Dept: FAMILY MEDICINE CLINIC | Facility: CLINIC | Age: 63
End: 2023-06-02

## 2023-06-02 NOTE — TELEPHONE ENCOUNTER
Levothyroxine 50 mcg to be sent to [facility] on [address] in [facility]. Patient only has 2 pills left.

## 2023-07-04 SDOH — ECONOMIC STABILITY: HOUSING INSECURITY
IN THE LAST 12 MONTHS, WAS THERE A TIME WHEN YOU DID NOT HAVE A STEADY PLACE TO SLEEP OR SLEPT IN A SHELTER (INCLUDING NOW)?: NO

## 2023-07-04 SDOH — ECONOMIC STABILITY: FOOD INSECURITY: WITHIN THE PAST 12 MONTHS, THE FOOD YOU BOUGHT JUST DIDN'T LAST AND YOU DIDN'T HAVE MONEY TO GET MORE.: NEVER TRUE

## 2023-07-04 SDOH — ECONOMIC STABILITY: TRANSPORTATION INSECURITY
IN THE PAST 12 MONTHS, HAS LACK OF TRANSPORTATION KEPT YOU FROM MEETINGS, WORK, OR FROM GETTING THINGS NEEDED FOR DAILY LIVING?: NO

## 2023-07-04 SDOH — ECONOMIC STABILITY: FOOD INSECURITY: WITHIN THE PAST 12 MONTHS, YOU WORRIED THAT YOUR FOOD WOULD RUN OUT BEFORE YOU GOT MONEY TO BUY MORE.: NEVER TRUE

## 2023-07-04 SDOH — ECONOMIC STABILITY: INCOME INSECURITY: HOW HARD IS IT FOR YOU TO PAY FOR THE VERY BASICS LIKE FOOD, HOUSING, MEDICAL CARE, AND HEATING?: NOT HARD AT ALL

## 2023-07-04 ASSESSMENT — PATIENT HEALTH QUESTIONNAIRE - PHQ9
1. LITTLE INTEREST OR PLEASURE IN DOING THINGS: NOT AT ALL
SUM OF ALL RESPONSES TO PHQ QUESTIONS 1-9: 0
1. LITTLE INTEREST OR PLEASURE IN DOING THINGS: 0
SUM OF ALL RESPONSES TO PHQ QUESTIONS 1-9: 0
2. FEELING DOWN, DEPRESSED OR HOPELESS: 0
SUM OF ALL RESPONSES TO PHQ9 QUESTIONS 1 & 2: 0
SUM OF ALL RESPONSES TO PHQ QUESTIONS 1-9: 0
2. FEELING DOWN, DEPRESSED OR HOPELESS: NOT AT ALL
SUM OF ALL RESPONSES TO PHQ QUESTIONS 1-9: 0
SUM OF ALL RESPONSES TO PHQ9 QUESTIONS 1 & 2: 0

## 2023-07-06 ENCOUNTER — OFFICE VISIT (OUTPATIENT)
Dept: FAMILY MEDICINE CLINIC | Facility: CLINIC | Age: 63
End: 2023-07-06
Payer: MEDICARE

## 2023-07-06 VITALS
DIASTOLIC BLOOD PRESSURE: 78 MMHG | TEMPERATURE: 97.6 F | WEIGHT: 145.6 LBS | HEIGHT: 60 IN | SYSTOLIC BLOOD PRESSURE: 132 MMHG | BODY MASS INDEX: 28.58 KG/M2 | OXYGEN SATURATION: 97 % | HEART RATE: 68 BPM

## 2023-07-06 DIAGNOSIS — G31.84 MILD COGNITIVE IMPAIRMENT: Primary | ICD-10-CM

## 2023-07-06 DIAGNOSIS — E78.5 HYPERLIPIDEMIA, UNSPECIFIED HYPERLIPIDEMIA TYPE: ICD-10-CM

## 2023-07-06 DIAGNOSIS — I67.89 CEREBRAL MICROVASCULAR DISEASE: ICD-10-CM

## 2023-07-06 DIAGNOSIS — E03.9 ACQUIRED HYPOTHYROIDISM: ICD-10-CM

## 2023-07-06 DIAGNOSIS — I10 PRIMARY HYPERTENSION: ICD-10-CM

## 2023-07-06 PROCEDURE — 99214 OFFICE O/P EST MOD 30 MIN: CPT | Performed by: FAMILY MEDICINE

## 2023-07-06 PROCEDURE — 1123F ACP DISCUSS/DSCN MKR DOCD: CPT | Performed by: FAMILY MEDICINE

## 2023-07-06 PROCEDURE — 3078F DIAST BP <80 MM HG: CPT | Performed by: FAMILY MEDICINE

## 2023-07-06 PROCEDURE — G8400 PT W/DXA NO RESULTS DOC: HCPCS | Performed by: FAMILY MEDICINE

## 2023-07-06 PROCEDURE — G8427 DOCREV CUR MEDS BY ELIG CLIN: HCPCS | Performed by: FAMILY MEDICINE

## 2023-07-06 PROCEDURE — 3017F COLORECTAL CA SCREEN DOC REV: CPT | Performed by: FAMILY MEDICINE

## 2023-07-06 PROCEDURE — 3075F SYST BP GE 130 - 139MM HG: CPT | Performed by: FAMILY MEDICINE

## 2023-07-06 PROCEDURE — 1036F TOBACCO NON-USER: CPT | Performed by: FAMILY MEDICINE

## 2023-07-06 PROCEDURE — G8417 CALC BMI ABV UP PARAM F/U: HCPCS | Performed by: FAMILY MEDICINE

## 2023-07-06 PROCEDURE — 1090F PRES/ABSN URINE INCON ASSESS: CPT | Performed by: FAMILY MEDICINE

## 2023-07-06 RX ORDER — LEVOTHYROXINE SODIUM 0.05 MG/1
50 TABLET ORAL DAILY
Qty: 90 TABLET | Refills: 3 | Status: SHIPPED | OUTPATIENT
Start: 2023-07-06

## 2023-07-06 RX ORDER — MEMANTINE HYDROCHLORIDE 5 MG/1
5 TABLET ORAL 2 TIMES DAILY
Qty: 180 TABLET | Refills: 1 | Status: SHIPPED | OUTPATIENT
Start: 2023-07-06

## 2023-07-06 ASSESSMENT — ENCOUNTER SYMPTOMS
CHEST TIGHTNESS: 0
CONSTIPATION: 0
ABDOMINAL PAIN: 0
COUGH: 0
DIARRHEA: 0
EYE DISCHARGE: 0
SHORTNESS OF BREATH: 0
SINUS PAIN: 0

## 2023-07-06 NOTE — PROGRESS NOTES
Gunjan Canales is a 70 y.o. female who presents with   Chief Complaint   Patient presents with    Medication Refill    Hypothyroidism     Did not schedule OV before ran out of meds, has been out for approx 6 weeks       History of Present Illness  Memory issues persist. Short term memory concerns. Mild vertigo this week at night. Had MRI with chronic white matter changes. CTA okay. Here for recheck of thyroid. Has been out of med which was started several months ago. No temperature intolerance. No palpitations. No skin or hair changes. No increased fatigue. No jitteriness. Mood good. Review of Systems  Review of Systems   Constitutional:  Negative for appetite change, fatigue and fever. HENT:  Negative for congestion, ear pain and sinus pain. Eyes:  Negative for discharge. Respiratory:  Negative for cough, chest tightness and shortness of breath. Cardiovascular:  Negative for chest pain, palpitations and leg swelling. Gastrointestinal:  Negative for abdominal pain, constipation and diarrhea. Genitourinary:  Negative for dysuria. Musculoskeletal:  Negative for joint swelling. Skin:  Negative for rash. Neurological:  Negative for headaches. Hematological:  Negative for adenopathy. Psychiatric/Behavioral:  Positive for sleep disturbance (mild insomnia). Negative for dysphoric mood. The patient is not nervous/anxious. Medications  Current Outpatient Medications   Medication Sig Dispense Refill    Cyanocobalamin (VITAMIN B-12 PO) Take by mouth      levothyroxine (SYNTHROID) 50 MCG tablet Take 1 tablet by mouth daily 90 tablet 3    memantine (NAMENDA) 5 MG tablet Take 1 tablet by mouth 2 times daily 180 tablet 1    simvastatin (ZOCOR) 10 MG tablet Take 1 tablet by mouth nightly TAKE 1 TABLET BY MOUTH EVERYDAY AT BEDTIME 90 tablet 3    LORazepam (ATIVAN) 0.5 MG tablet Take 1 tablet by mouth every 6 hours as needed for Anxiety.       hydroCHLOROthiazide (HYDRODIURIL) 25 MG tablet

## 2023-08-17 ENCOUNTER — OFFICE VISIT (OUTPATIENT)
Dept: FAMILY MEDICINE CLINIC | Facility: CLINIC | Age: 63
End: 2023-08-17
Payer: MEDICARE

## 2023-08-17 VITALS
HEART RATE: 68 BPM | DIASTOLIC BLOOD PRESSURE: 78 MMHG | WEIGHT: 144.2 LBS | RESPIRATION RATE: 16 BRPM | OXYGEN SATURATION: 97 % | TEMPERATURE: 98.2 F | SYSTOLIC BLOOD PRESSURE: 125 MMHG | BODY MASS INDEX: 28.31 KG/M2 | HEIGHT: 60 IN

## 2023-08-17 DIAGNOSIS — I10 PRIMARY HYPERTENSION: Primary | ICD-10-CM

## 2023-08-17 DIAGNOSIS — E78.2 MIXED HYPERLIPIDEMIA: ICD-10-CM

## 2023-08-17 DIAGNOSIS — E03.9 ACQUIRED HYPOTHYROIDISM: ICD-10-CM

## 2023-08-17 DIAGNOSIS — R73.01 ELEVATED FASTING GLUCOSE: ICD-10-CM

## 2023-08-17 DIAGNOSIS — G31.84 MILD COGNITIVE IMPAIRMENT: ICD-10-CM

## 2023-08-17 LAB
EST. AVERAGE GLUCOSE BLD GHB EST-MCNC: 120 MG/DL
HBA1C MFR BLD: 5.8 % (ref 4.8–5.6)

## 2023-08-17 PROCEDURE — 1090F PRES/ABSN URINE INCON ASSESS: CPT | Performed by: FAMILY MEDICINE

## 2023-08-17 PROCEDURE — G8427 DOCREV CUR MEDS BY ELIG CLIN: HCPCS | Performed by: FAMILY MEDICINE

## 2023-08-17 PROCEDURE — G8417 CALC BMI ABV UP PARAM F/U: HCPCS | Performed by: FAMILY MEDICINE

## 2023-08-17 PROCEDURE — 99214 OFFICE O/P EST MOD 30 MIN: CPT | Performed by: FAMILY MEDICINE

## 2023-08-17 PROCEDURE — 1123F ACP DISCUSS/DSCN MKR DOCD: CPT | Performed by: FAMILY MEDICINE

## 2023-08-17 PROCEDURE — 3074F SYST BP LT 130 MM HG: CPT | Performed by: FAMILY MEDICINE

## 2023-08-17 PROCEDURE — G8400 PT W/DXA NO RESULTS DOC: HCPCS | Performed by: FAMILY MEDICINE

## 2023-08-17 PROCEDURE — 3017F COLORECTAL CA SCREEN DOC REV: CPT | Performed by: FAMILY MEDICINE

## 2023-08-17 PROCEDURE — 1036F TOBACCO NON-USER: CPT | Performed by: FAMILY MEDICINE

## 2023-08-17 PROCEDURE — 3078F DIAST BP <80 MM HG: CPT | Performed by: FAMILY MEDICINE

## 2023-08-17 RX ORDER — HYDROCHLOROTHIAZIDE 25 MG/1
TABLET ORAL
Qty: 90 TABLET | Refills: 3 | Status: SHIPPED | OUTPATIENT
Start: 2023-08-17

## 2023-08-17 ASSESSMENT — PATIENT HEALTH QUESTIONNAIRE - PHQ9
1. LITTLE INTEREST OR PLEASURE IN DOING THINGS: 0
SUM OF ALL RESPONSES TO PHQ QUESTIONS 1-9: 0
2. FEELING DOWN, DEPRESSED OR HOPELESS: 0
SUM OF ALL RESPONSES TO PHQ QUESTIONS 1-9: 0
SUM OF ALL RESPONSES TO PHQ9 QUESTIONS 1 & 2: 0

## 2023-08-17 ASSESSMENT — ENCOUNTER SYMPTOMS
CONSTIPATION: 0
SINUS PAIN: 0
SHORTNESS OF BREATH: 0
CHEST TIGHTNESS: 0
ABDOMINAL PAIN: 0
EYE DISCHARGE: 0
COUGH: 0
DIARRHEA: 0

## 2023-08-17 NOTE — PROGRESS NOTES
Zacarias Laguna is a 70 y.o. female who presents with   Chief Complaint   Patient presents with    Follow-up       History of Present Illness  Pt with continued memory concerns. Mainly trouble remembering recent  conversations. Started Namenda. Not much difference with Namenda but no side effects. BP has been doing well at home. No CP or SOB. No headaches or edema. No side effects with medications. Exercising regularly. Here for recheck of thyroid. No temperature intolerance. No palpitations. No skin or hair changes. No increased fatigue. No jitteriness. Mood good. Review of Systems  Review of Systems   Constitutional:  Negative for appetite change, fatigue and fever. HENT:  Negative for congestion, ear pain and sinus pain. Eyes:  Negative for discharge. Respiratory:  Negative for cough, chest tightness and shortness of breath. Cardiovascular:  Negative for chest pain, palpitations and leg swelling. Gastrointestinal:  Negative for abdominal pain, constipation and diarrhea. Genitourinary:  Negative for dysuria. Musculoskeletal:  Negative for joint swelling. Skin:  Negative for rash. Neurological:  Negative for headaches. Hematological:  Negative for adenopathy. Psychiatric/Behavioral:  Negative for dysphoric mood. The patient is not nervous/anxious. Medications  Current Outpatient Medications   Medication Sig Dispense Refill    hydroCHLOROthiazide (HYDRODIURIL) 25 MG tablet TAKE 1 TABLET BY MOUTH EVERY DAY 90 tablet 3    Cyanocobalamin (VITAMIN B-12 PO) Take by mouth      levothyroxine (SYNTHROID) 50 MCG tablet Take 1 tablet by mouth daily 90 tablet 3    memantine (NAMENDA) 5 MG tablet Take 1 tablet by mouth 2 times daily 180 tablet 1    simvastatin (ZOCOR) 10 MG tablet Take 1 tablet by mouth nightly TAKE 1 TABLET BY MOUTH EVERYDAY AT BEDTIME 90 tablet 3    LORazepam (ATIVAN) 0.5 MG tablet Take 1 tablet by mouth every 6 hours as needed for Anxiety.        No current

## 2023-08-18 LAB
ALBUMIN SERPL-MCNC: 4.1 G/DL (ref 3.2–4.6)
ALBUMIN/GLOB SERPL: 1.4 (ref 0.4–1.6)
ALP SERPL-CCNC: 59 U/L (ref 50–136)
ALT SERPL-CCNC: 21 U/L (ref 12–65)
ANION GAP SERPL CALC-SCNC: 7 MMOL/L (ref 2–11)
AST SERPL-CCNC: 21 U/L (ref 15–37)
BILIRUB SERPL-MCNC: 0.6 MG/DL (ref 0.2–1.1)
BUN SERPL-MCNC: 22 MG/DL (ref 8–23)
CALCIUM SERPL-MCNC: 9.7 MG/DL (ref 8.3–10.4)
CHLORIDE SERPL-SCNC: 105 MMOL/L (ref 101–110)
CHOLEST SERPL-MCNC: 179 MG/DL
CO2 SERPL-SCNC: 29 MMOL/L (ref 21–32)
CREAT SERPL-MCNC: 0.7 MG/DL (ref 0.6–1)
GLOBULIN SER CALC-MCNC: 3 G/DL (ref 2.8–4.5)
GLUCOSE SERPL-MCNC: 86 MG/DL (ref 65–100)
HDLC SERPL-MCNC: 63 MG/DL (ref 40–60)
HDLC SERPL: 2.8
LDLC SERPL CALC-MCNC: 95.2 MG/DL
POTASSIUM SERPL-SCNC: 3.6 MMOL/L (ref 3.5–5.1)
PROT SERPL-MCNC: 7.1 G/DL (ref 6.3–8.2)
SODIUM SERPL-SCNC: 141 MMOL/L (ref 133–143)
TRIGL SERPL-MCNC: 104 MG/DL (ref 35–150)
TSH W FREE THYROID IF ABNORMAL: 1.77 UIU/ML (ref 0.36–3.74)
VLDLC SERPL CALC-MCNC: 20.8 MG/DL (ref 6–23)

## 2023-09-28 ENCOUNTER — NURSE ONLY (OUTPATIENT)
Dept: FAMILY MEDICINE CLINIC | Facility: CLINIC | Age: 63
End: 2023-09-28

## 2023-09-28 DIAGNOSIS — I10 PRIMARY HYPERTENSION: ICD-10-CM

## 2023-09-28 DIAGNOSIS — I10 PRIMARY HYPERTENSION: Primary | ICD-10-CM

## 2023-09-28 LAB
BASOPHILS # BLD: 0.1 K/UL (ref 0–0.2)
BASOPHILS NFR BLD: 1 % (ref 0–2)
DIFFERENTIAL METHOD BLD: NORMAL
EOSINOPHIL # BLD: 0.1 K/UL (ref 0–0.8)
EOSINOPHIL NFR BLD: 1 % (ref 0.5–7.8)
ERYTHROCYTE [DISTWIDTH] IN BLOOD BY AUTOMATED COUNT: 12.6 % (ref 11.9–14.6)
HCT VFR BLD AUTO: 46 % (ref 35.8–46.3)
HGB BLD-MCNC: 15.3 G/DL (ref 11.7–15.4)
IMM GRANULOCYTES # BLD AUTO: 0 K/UL (ref 0–0.5)
IMM GRANULOCYTES NFR BLD AUTO: 0 % (ref 0–5)
LYMPHOCYTES # BLD: 1.2 K/UL (ref 0.5–4.6)
LYMPHOCYTES NFR BLD: 18 % (ref 13–44)
MCH RBC QN AUTO: 31.1 PG (ref 26.1–32.9)
MCHC RBC AUTO-ENTMCNC: 33.3 G/DL (ref 31.4–35)
MCV RBC AUTO: 93.5 FL (ref 82–102)
MONOCYTES # BLD: 0.7 K/UL (ref 0.1–1.3)
MONOCYTES NFR BLD: 11 % (ref 4–12)
NEUTS SEG # BLD: 4.7 K/UL (ref 1.7–8.2)
NEUTS SEG NFR BLD: 69 % (ref 43–78)
NRBC # BLD: 0 K/UL (ref 0–0.2)
PLATELET # BLD AUTO: 167 K/UL (ref 150–450)
PMV BLD AUTO: 10.6 FL (ref 9.4–12.3)
RBC # BLD AUTO: 4.92 M/UL (ref 4.05–5.2)
WBC # BLD AUTO: 6.7 K/UL (ref 4.3–11.1)

## 2023-10-02 ENCOUNTER — TRANSCRIBE ORDERS (OUTPATIENT)
Dept: SCHEDULING | Age: 63
End: 2023-10-02

## 2023-10-02 DIAGNOSIS — Z12.31 SCREENING MAMMOGRAM FOR BREAST CANCER: Primary | ICD-10-CM

## 2023-10-04 ASSESSMENT — PATIENT HEALTH QUESTIONNAIRE - PHQ9
SUM OF ALL RESPONSES TO PHQ9 QUESTIONS 1 & 2: 0
2. FEELING DOWN, DEPRESSED OR HOPELESS: 0
1. LITTLE INTEREST OR PLEASURE IN DOING THINGS: NOT AT ALL
SUM OF ALL RESPONSES TO PHQ QUESTIONS 1-9: 0
SUM OF ALL RESPONSES TO PHQ9 QUESTIONS 1 & 2: 0
2. FEELING DOWN, DEPRESSED OR HOPELESS: NOT AT ALL
SUM OF ALL RESPONSES TO PHQ QUESTIONS 1-9: 0
SUM OF ALL RESPONSES TO PHQ QUESTIONS 1-9: 0
1. LITTLE INTEREST OR PLEASURE IN DOING THINGS: 0
SUM OF ALL RESPONSES TO PHQ QUESTIONS 1-9: 0

## 2023-10-06 ENCOUNTER — OFFICE VISIT (OUTPATIENT)
Dept: FAMILY MEDICINE CLINIC | Facility: CLINIC | Age: 63
End: 2023-10-06

## 2023-10-06 VITALS
DIASTOLIC BLOOD PRESSURE: 78 MMHG | WEIGHT: 145 LBS | HEIGHT: 60 IN | HEART RATE: 64 BPM | BODY MASS INDEX: 28.47 KG/M2 | SYSTOLIC BLOOD PRESSURE: 134 MMHG | TEMPERATURE: 98 F | OXYGEN SATURATION: 99 %

## 2023-10-06 DIAGNOSIS — Z00.00 WELCOME TO MEDICARE PREVENTIVE VISIT: Primary | ICD-10-CM

## 2023-10-06 DIAGNOSIS — G31.84 MILD COGNITIVE IMPAIRMENT: ICD-10-CM

## 2023-10-06 DIAGNOSIS — I67.89 CEREBRAL MICROVASCULAR DISEASE: ICD-10-CM

## 2023-10-06 DIAGNOSIS — E78.5 HYPERLIPIDEMIA, UNSPECIFIED HYPERLIPIDEMIA TYPE: ICD-10-CM

## 2023-10-06 DIAGNOSIS — E03.9 ACQUIRED HYPOTHYROIDISM: ICD-10-CM

## 2023-10-06 DIAGNOSIS — I10 PRIMARY HYPERTENSION: ICD-10-CM

## 2023-10-06 RX ORDER — SIMVASTATIN 10 MG
10 TABLET ORAL NIGHTLY
Qty: 90 TABLET | Refills: 3 | Status: SHIPPED | OUTPATIENT
Start: 2023-10-06

## 2023-10-06 RX ORDER — MEMANTINE HYDROCHLORIDE 5 MG/1
5 TABLET ORAL 2 TIMES DAILY
Qty: 180 TABLET | Refills: 1 | Status: SHIPPED | OUTPATIENT
Start: 2023-10-06

## 2023-10-06 ASSESSMENT — PATIENT HEALTH QUESTIONNAIRE - PHQ9
SUM OF ALL RESPONSES TO PHQ9 QUESTIONS 1 & 2: 0
SUM OF ALL RESPONSES TO PHQ QUESTIONS 1-9: 0
SUM OF ALL RESPONSES TO PHQ QUESTIONS 1-9: 0
1. LITTLE INTEREST OR PLEASURE IN DOING THINGS: 0
2. FEELING DOWN, DEPRESSED OR HOPELESS: 0
SUM OF ALL RESPONSES TO PHQ QUESTIONS 1-9: 0
SUM OF ALL RESPONSES TO PHQ QUESTIONS 1-9: 0

## 2023-10-06 ASSESSMENT — LIFESTYLE VARIABLES
HOW OFTEN DO YOU HAVE A DRINK CONTAINING ALCOHOL: NEVER
HOW MANY STANDARD DRINKS CONTAINING ALCOHOL DO YOU HAVE ON A TYPICAL DAY: PATIENT DOES NOT DRINK

## 2023-10-06 ASSESSMENT — VISUAL ACUITY
OS_CC: 20/20
OD_CC: 20/20

## 2023-10-24 ENCOUNTER — HOSPITAL ENCOUNTER (OUTPATIENT)
Dept: MAMMOGRAPHY | Age: 63
Discharge: HOME | End: 2023-10-27
Attending: FAMILY MEDICINE
Payer: MEDICARE

## 2023-10-24 DIAGNOSIS — Z12.31 SCREENING MAMMOGRAM FOR BREAST CANCER: ICD-10-CM

## 2023-10-24 PROCEDURE — 77063 BREAST TOMOSYNTHESIS BI: CPT

## 2024-01-02 DIAGNOSIS — R41.3 MEMORY DEFICIT: ICD-10-CM

## 2024-01-02 DIAGNOSIS — I67.89 CEREBRAL MICROVASCULAR DISEASE: Primary | ICD-10-CM

## 2024-01-10 ENCOUNTER — OFFICE VISIT (OUTPATIENT)
Dept: NEUROLOGY | Age: 64
End: 2024-01-10
Payer: MEDICARE

## 2024-01-10 DIAGNOSIS — R41.3 MEMORY LOSS: ICD-10-CM

## 2024-01-10 DIAGNOSIS — G31.84 MCI (MILD COGNITIVE IMPAIRMENT): Primary | ICD-10-CM

## 2024-01-10 PROCEDURE — 99215 OFFICE O/P EST HI 40 MIN: CPT | Performed by: PSYCHIATRY & NEUROLOGY

## 2024-01-10 PROCEDURE — 3017F COLORECTAL CA SCREEN DOC REV: CPT | Performed by: PSYCHIATRY & NEUROLOGY

## 2024-01-10 PROCEDURE — G8417 CALC BMI ABV UP PARAM F/U: HCPCS | Performed by: PSYCHIATRY & NEUROLOGY

## 2024-01-10 PROCEDURE — 1036F TOBACCO NON-USER: CPT | Performed by: PSYCHIATRY & NEUROLOGY

## 2024-01-10 PROCEDURE — G8400 PT W/DXA NO RESULTS DOC: HCPCS | Performed by: PSYCHIATRY & NEUROLOGY

## 2024-01-10 PROCEDURE — G8484 FLU IMMUNIZE NO ADMIN: HCPCS | Performed by: PSYCHIATRY & NEUROLOGY

## 2024-01-10 PROCEDURE — 1090F PRES/ABSN URINE INCON ASSESS: CPT | Performed by: PSYCHIATRY & NEUROLOGY

## 2024-01-10 PROCEDURE — 1123F ACP DISCUSS/DSCN MKR DOCD: CPT | Performed by: PSYCHIATRY & NEUROLOGY

## 2024-01-10 PROCEDURE — G8427 DOCREV CUR MEDS BY ELIG CLIN: HCPCS | Performed by: PSYCHIATRY & NEUROLOGY

## 2024-01-10 RX ORDER — MEMANTINE HYDROCHLORIDE 10 MG/1
10 TABLET ORAL 2 TIMES DAILY
Qty: 180 TABLET | Refills: 3 | Status: SHIPPED | OUTPATIENT
Start: 2024-01-10

## 2024-01-10 ASSESSMENT — ENCOUNTER SYMPTOMS
ALLERGIC/IMMUNOLOGIC NEGATIVE: 1
RESPIRATORY NEGATIVE: 1
GASTROINTESTINAL NEGATIVE: 1
EYES NEGATIVE: 1

## 2024-01-10 NOTE — PROGRESS NOTES
[facility]UROLOG[facility]  [address] [place] [address], [address]  [place] [address]  Phone: [phone] Fax [phone]  Dr. Sha Uriarte      1/10/2024  Mariya Milner     Patient is referred by the following provider for consultation regarding as below:       I reviewed the available records and notes and have examined patient with the following findings:     Chief Complaint:  No chief complaint on file.         HPI: This is a right handed 63 y.o.  female who is very pleasant very appropriate patient who is here with her  today.  She is a rather regular patient of Dr. HECTOR.  The patient has only been seen via telemedicine.  The patient started having memory loss about  and slowly may have gotten a little bit worse but it is a very slow process.  She will often repeat questions and or conversations within a 10 to 15-minute.  The fastest most typically it is later in the day.  She uses a calendar on her phone as well and has a written calendar at home to keep her life organized which she utilizes daily.  Her  does the bills and always has.  She does her own medications she takes care of all of her own activities of daily living.  Her both parents  at a very young age so they never got to the point where dementia might be an issue.  The patient is seeing Dr. HECTOR and had an MRI of the brain done in 2023 which showed mild white matter changes she had an EEG done on April that was normal and a CTA of the head back that did better that was normal.  Blood studies for dementia PET scans have not been done as of yet.  Nor is a spinal tap.  I did have a lengthy conversation about liquidity and the progression of issues and side effects.    IMAGING REVIEW:  I REVIEWED PERTINENT  IMAGES AND REPORTS WITH THE PATIENT PERSONALLY, DIRECTLY AND FULLY.     Past Medical History:  Past Medical History:   Diagnosis Date    Hypercholesterolemia     Hypertension        Past Surgical History:  Past Surgical

## 2024-01-17 ENCOUNTER — CLINICAL DOCUMENTATION (OUTPATIENT)
Dept: NEUROLOGY | Age: 64
End: 2024-01-17

## 2024-01-17 NOTE — PROGRESS NOTES
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  We scanned these into the system

## 2024-03-28 ENCOUNTER — CLINICAL DOCUMENTATION (OUTPATIENT)
Dept: NEUROLOGY | Age: 64
End: 2024-03-28

## 2024-03-28 NOTE — PROGRESS NOTES
The patient's amyloid PET scan from [facility] came back and is significant beta amyloid deposits in consistent with dementia Alzheimer's.  The amyloid PET scans for diagnosis of nodules which patient back also we are able to order these are not easy but we will

## 2024-04-09 ASSESSMENT — PATIENT HEALTH QUESTIONNAIRE - PHQ9
SUM OF ALL RESPONSES TO PHQ9 QUESTIONS 1 & 2: 0
SUM OF ALL RESPONSES TO PHQ QUESTIONS 1-9: 0
SUM OF ALL RESPONSES TO PHQ QUESTIONS 1-9: 0
2. FEELING DOWN, DEPRESSED OR HOPELESS: NOT AT ALL
SUM OF ALL RESPONSES TO PHQ9 QUESTIONS 1 & 2: 0
1. LITTLE INTEREST OR PLEASURE IN DOING THINGS: NOT AT ALL
1. LITTLE INTEREST OR PLEASURE IN DOING THINGS: NOT AT ALL
SUM OF ALL RESPONSES TO PHQ QUESTIONS 1-9: 0
2. FEELING DOWN, DEPRESSED OR HOPELESS: NOT AT ALL
SUM OF ALL RESPONSES TO PHQ QUESTIONS 1-9: 0

## 2024-04-11 ENCOUNTER — OFFICE VISIT (OUTPATIENT)
Dept: FAMILY MEDICINE CLINIC | Facility: CLINIC | Age: 64
End: 2024-04-11

## 2024-04-11 VITALS
HEART RATE: 65 BPM | BODY MASS INDEX: 28.35 KG/M2 | WEIGHT: 144.4 LBS | DIASTOLIC BLOOD PRESSURE: 72 MMHG | OXYGEN SATURATION: 100 % | HEIGHT: 60 IN | SYSTOLIC BLOOD PRESSURE: 118 MMHG | TEMPERATURE: 97.2 F

## 2024-04-11 DIAGNOSIS — I10 PRIMARY HYPERTENSION: Primary | ICD-10-CM

## 2024-04-11 DIAGNOSIS — E78.5 HYPERLIPIDEMIA, UNSPECIFIED HYPERLIPIDEMIA TYPE: ICD-10-CM

## 2024-04-11 DIAGNOSIS — F02.80 ALZHEIMER DISEASE (HCC): ICD-10-CM

## 2024-04-11 DIAGNOSIS — E03.9 ACQUIRED HYPOTHYROIDISM: ICD-10-CM

## 2024-04-11 DIAGNOSIS — G30.9 ALZHEIMER DISEASE (HCC): ICD-10-CM

## 2024-04-11 RX ORDER — SIMVASTATIN 10 MG
10 TABLET ORAL NIGHTLY
Qty: 90 TABLET | Refills: 3 | Status: SHIPPED | OUTPATIENT
Start: 2024-04-11

## 2024-04-11 RX ORDER — HYDROCHLOROTHIAZIDE 25 MG/1
TABLET ORAL
Qty: 90 TABLET | Refills: 3 | Status: SHIPPED | OUTPATIENT
Start: 2024-04-11

## 2024-04-11 RX ORDER — LEVOTHYROXINE SODIUM 0.05 MG/1
50 TABLET ORAL DAILY
Qty: 90 TABLET | Refills: 3 | Status: SHIPPED | OUTPATIENT
Start: 2024-04-11

## 2024-04-11 ASSESSMENT — ENCOUNTER SYMPTOMS
EYE DISCHARGE: 0
DIARRHEA: 0
ABDOMINAL PAIN: 0
COUGH: 0
SINUS PAIN: 0
CHEST TIGHTNESS: 0
SHORTNESS OF BREATH: 0
CONSTIPATION: 0

## 2024-04-11 NOTE — PROGRESS NOTES
Mariya Milner is a 64 y.o. female who presents with   Chief Complaint   Patient presents with    Hypertension     Does not check BP at home. Denies unusual HAs, dizziness, edema.    Hyperlipidemia    Hypothyroidism    Memory Loss     Had PET scan, sees neuro next month for f/u       History of Present Illness  Pt with memory loss and Amyloid labs and PET c/w Alzheimer's.  Memory fairly stable over last 6 months.   BP has been doing well at home.  No CP or SOB.  No headaches or edema.  No side effects with medications.  Exercising regularly. Here for recheck of thyroid. TSH good in Jan.  No temperature intolerance.  No palpitations.  No skin or hair changes.  No increased fatigue.  No jitteriness.  Mood good.    Review of Systems  Review of Systems   Constitutional:  Negative for appetite change, fatigue and fever.   HENT:  Negative for congestion, ear pain and sinus pain.    Eyes:  Negative for discharge.   Respiratory:  Negative for cough, chest tightness and shortness of breath.    Cardiovascular:  Negative for chest pain, palpitations and leg swelling.   Gastrointestinal:  Negative for abdominal pain, constipation and diarrhea.   Genitourinary:  Negative for dysuria.   Musculoskeletal:  Negative for joint swelling.   Skin:  Negative for rash.   Neurological:  Negative for headaches.   Hematological:  Negative for adenopathy.   Psychiatric/Behavioral:  Negative for dysphoric mood. The patient is not nervous/anxious.         Medications  Current Outpatient Medications   Medication Sig Dispense Refill    Multiple Vitamin (MULTIVITAMIN PO) Take by mouth      Cyanocobalamin (B-12 PO) Take by mouth      simvastatin (ZOCOR) 10 MG tablet Take 1 tablet by mouth nightly TAKE 1 TABLET BY MOUTH EVERYDAY AT BEDTIME 90 tablet 3    hydroCHLOROthiazide (HYDRODIURIL) 25 MG tablet TAKE 1 TABLET BY MOUTH EVERY DAY 90 tablet 3    levothyroxine (SYNTHROID) 50 MCG tablet Take 1 tablet by mouth daily 90 tablet 3    memantine

## 2024-05-15 ENCOUNTER — OFFICE VISIT (OUTPATIENT)
Dept: NEUROLOGY | Age: 64
End: 2024-05-15
Payer: MEDICARE

## 2024-05-15 DIAGNOSIS — F02.A0 MILD ALZHEIMER'S DEMENTIA OF OTHER ONSET, WITHOUT BEHAVIORAL DISTURBANCE, PSYCHOTIC DISTURBANCE, MOOD DISTURBANCE, OR ANXIETY (HCC): Primary | ICD-10-CM

## 2024-05-15 DIAGNOSIS — G30.8 MILD ALZHEIMER'S DEMENTIA OF OTHER ONSET, WITHOUT BEHAVIORAL DISTURBANCE, PSYCHOTIC DISTURBANCE, MOOD DISTURBANCE, OR ANXIETY (HCC): Primary | ICD-10-CM

## 2024-05-15 PROCEDURE — G8400 PT W/DXA NO RESULTS DOC: HCPCS | Performed by: PSYCHIATRY & NEUROLOGY

## 2024-05-15 PROCEDURE — 1090F PRES/ABSN URINE INCON ASSESS: CPT | Performed by: PSYCHIATRY & NEUROLOGY

## 2024-05-15 PROCEDURE — G8427 DOCREV CUR MEDS BY ELIG CLIN: HCPCS | Performed by: PSYCHIATRY & NEUROLOGY

## 2024-05-15 PROCEDURE — 1123F ACP DISCUSS/DSCN MKR DOCD: CPT | Performed by: PSYCHIATRY & NEUROLOGY

## 2024-05-15 PROCEDURE — 3017F COLORECTAL CA SCREEN DOC REV: CPT | Performed by: PSYCHIATRY & NEUROLOGY

## 2024-05-15 PROCEDURE — 1036F TOBACCO NON-USER: CPT | Performed by: PSYCHIATRY & NEUROLOGY

## 2024-05-15 PROCEDURE — 99214 OFFICE O/P EST MOD 30 MIN: CPT | Performed by: PSYCHIATRY & NEUROLOGY

## 2024-05-15 PROCEDURE — G8417 CALC BMI ABV UP PARAM F/U: HCPCS | Performed by: PSYCHIATRY & NEUROLOGY

## 2024-05-15 ASSESSMENT — ENCOUNTER SYMPTOMS
EYES NEGATIVE: 1
GASTROINTESTINAL NEGATIVE: 1
RESPIRATORY NEGATIVE: 1

## 2024-05-15 NOTE — PROGRESS NOTES
normal    Tremor   Resting tremor: absent  Intention tremor: absent  Action tremor: absent          Assessment   Assessment / Plan:    Diagnoses and all orders for this visit:    Mild Alzheimer's dementia of other onset, without behavioral disturbance, psychotic disturbance, mood disturbance, or anxiety (HCC) with the amyloid beta 42/40 being at high risk and her amyloid PET scan being positive we did have a lengthy conversation about [place] have they are going to look at.  In the meantime we will continue Namenda 10 twice a day and start the patient on Aricept 5 mg daily.  We had a lengthy conversation about the disease and the disease process and progression I also offered her second opinions.        The Diagnosis and differential diagnostic considerations, and Rx Tx were reviewed with the patient at length.           No orders of the defined types were placed in this encounter.         I have spent greater than 50% of visit discussing and counseling of patient  for treatment and diagnostic plan review. Total time30     .      Notes: Patient is to continue all medications as directed by prescribing physicians. Continuations on today's visit are made based on the patient's report of current medications.             Dr. Sha Uriarte  Consultation Neurology, Neurodiagnostics and Neurotherapeutics  Neuroelectrophysiology, EEG, EMG  [facility] Neurology  [address], [address]  [place] [address]  Phone: [phone] Fax [phone]

## 2024-05-20 ENCOUNTER — CLINICAL DOCUMENTATION (OUTPATIENT)
Dept: NEUROLOGY | Age: 64
End: 2024-05-20

## 2024-05-20 RX ORDER — DONEPEZIL HYDROCHLORIDE 5 MG/1
5 TABLET, FILM COATED ORAL NIGHTLY
Qty: 90 TABLET | Refills: 3 | Status: SHIPPED | OUTPATIENT
Start: 2024-05-20

## 2024-10-13 SDOH — ECONOMIC STABILITY: FOOD INSECURITY: WITHIN THE PAST 12 MONTHS, THE FOOD YOU BOUGHT JUST DIDN'T LAST AND YOU DIDN'T HAVE MONEY TO GET MORE.: NEVER TRUE

## 2024-10-13 SDOH — ECONOMIC STABILITY: INCOME INSECURITY: HOW HARD IS IT FOR YOU TO PAY FOR THE VERY BASICS LIKE FOOD, HOUSING, MEDICAL CARE, AND HEATING?: NOT HARD AT ALL

## 2024-10-13 SDOH — HEALTH STABILITY: PHYSICAL HEALTH: ON AVERAGE, HOW MANY MINUTES DO YOU ENGAGE IN EXERCISE AT THIS LEVEL?: 90 MIN

## 2024-10-13 SDOH — ECONOMIC STABILITY: FOOD INSECURITY: WITHIN THE PAST 12 MONTHS, YOU WORRIED THAT YOUR FOOD WOULD RUN OUT BEFORE YOU GOT MONEY TO BUY MORE.: NEVER TRUE

## 2024-10-13 SDOH — HEALTH STABILITY: PHYSICAL HEALTH: ON AVERAGE, HOW MANY DAYS PER WEEK DO YOU ENGAGE IN MODERATE TO STRENUOUS EXERCISE (LIKE A BRISK WALK)?: 6 DAYS

## 2024-10-13 ASSESSMENT — LIFESTYLE VARIABLES
HOW OFTEN DO YOU HAVE A DRINK CONTAINING ALCOHOL: MONTHLY OR LESS
HOW MANY STANDARD DRINKS CONTAINING ALCOHOL DO YOU HAVE ON A TYPICAL DAY: 1 OR 2
HOW OFTEN DO YOU HAVE A DRINK CONTAINING ALCOHOL: 2
HOW OFTEN DO YOU HAVE SIX OR MORE DRINKS ON ONE OCCASION: 1
HOW MANY STANDARD DRINKS CONTAINING ALCOHOL DO YOU HAVE ON A TYPICAL DAY: 1

## 2024-10-13 ASSESSMENT — PATIENT HEALTH QUESTIONNAIRE - PHQ9
SUM OF ALL RESPONSES TO PHQ QUESTIONS 1-9: 0
1. LITTLE INTEREST OR PLEASURE IN DOING THINGS: NOT AT ALL
SUM OF ALL RESPONSES TO PHQ9 QUESTIONS 1 & 2: 0
2. FEELING DOWN, DEPRESSED OR HOPELESS: NOT AT ALL
SUM OF ALL RESPONSES TO PHQ QUESTIONS 1-9: 0

## 2024-10-14 PROBLEM — G30.9 ALZHEIMER DISEASE (HCC): Status: ACTIVE | Noted: 2024-10-14

## 2024-10-14 PROBLEM — F02.80 ALZHEIMER DISEASE (HCC): Status: ACTIVE | Noted: 2024-10-14

## 2024-10-14 PROBLEM — E03.9 ACQUIRED HYPOTHYROIDISM: Status: ACTIVE | Noted: 2024-10-14

## 2024-10-15 ENCOUNTER — OFFICE VISIT (OUTPATIENT)
Dept: FAMILY MEDICINE CLINIC | Facility: CLINIC | Age: 64
End: 2024-10-15

## 2024-10-15 VITALS
HEIGHT: 60 IN | BODY MASS INDEX: 28.47 KG/M2 | WEIGHT: 145 LBS | SYSTOLIC BLOOD PRESSURE: 112 MMHG | TEMPERATURE: 98.6 F | HEART RATE: 59 BPM | DIASTOLIC BLOOD PRESSURE: 60 MMHG | OXYGEN SATURATION: 98 %

## 2024-10-15 DIAGNOSIS — Z00.00 MEDICARE ANNUAL WELLNESS VISIT, SUBSEQUENT: Primary | ICD-10-CM

## 2024-10-15 DIAGNOSIS — E03.9 ACQUIRED HYPOTHYROIDISM: ICD-10-CM

## 2024-10-15 DIAGNOSIS — Z78.0 ASYMPTOMATIC MENOPAUSAL STATE: ICD-10-CM

## 2024-10-15 DIAGNOSIS — E28.39 ESTROGEN DEFICIENCY: ICD-10-CM

## 2024-10-15 DIAGNOSIS — Z12.31 ENCOUNTER FOR SCREENING MAMMOGRAM FOR BREAST CANCER: ICD-10-CM

## 2024-10-15 DIAGNOSIS — I10 PRIMARY HYPERTENSION: ICD-10-CM

## 2024-10-15 DIAGNOSIS — R73.03 PREDIABETES: ICD-10-CM

## 2024-10-15 DIAGNOSIS — M25.511 ACUTE PAIN OF RIGHT SHOULDER: ICD-10-CM

## 2024-10-15 DIAGNOSIS — E78.5 HYPERLIPIDEMIA, UNSPECIFIED HYPERLIPIDEMIA TYPE: ICD-10-CM

## 2024-10-15 DIAGNOSIS — F02.80 ALZHEIMER DISEASE (HCC): ICD-10-CM

## 2024-10-15 DIAGNOSIS — Z00.00 ROUTINE GENERAL MEDICAL EXAMINATION AT A HEALTH CARE FACILITY: ICD-10-CM

## 2024-10-15 DIAGNOSIS — Z23 NEED FOR PROPHYLACTIC VACCINATION AGAINST STREPTOCOCCUS PNEUMONIAE (PNEUMOCOCCUS): ICD-10-CM

## 2024-10-15 DIAGNOSIS — G30.9 ALZHEIMER DISEASE (HCC): ICD-10-CM

## 2024-10-15 LAB
ALBUMIN SERPL-MCNC: 3.9 G/DL (ref 3.2–4.6)
ALBUMIN/GLOB SERPL: 1.2 (ref 1–1.9)
ALP SERPL-CCNC: 58 U/L (ref 35–104)
ALT SERPL-CCNC: 23 U/L (ref 8–45)
ANION GAP SERPL CALC-SCNC: 10 MMOL/L (ref 9–18)
APPEARANCE UR: CLEAR
AST SERPL-CCNC: 25 U/L (ref 15–37)
BACTERIA URNS QL MICRO: NEGATIVE /HPF
BASOPHILS # BLD: 0 K/UL (ref 0–0.2)
BASOPHILS NFR BLD: 1 % (ref 0–2)
BILIRUB SERPL-MCNC: 0.6 MG/DL (ref 0–1.2)
BILIRUB UR QL: NEGATIVE
BUN SERPL-MCNC: 20 MG/DL (ref 8–23)
CALCIUM SERPL-MCNC: 10 MG/DL (ref 8.8–10.2)
CHLORIDE SERPL-SCNC: 102 MMOL/L (ref 98–107)
CHOLEST SERPL-MCNC: 197 MG/DL (ref 0–200)
CO2 SERPL-SCNC: 29 MMOL/L (ref 20–28)
COLOR UR: ABNORMAL
CREAT SERPL-MCNC: 0.69 MG/DL (ref 0.6–1.1)
DIFFERENTIAL METHOD BLD: NORMAL
EOSINOPHIL # BLD: 0.1 K/UL (ref 0–0.8)
EOSINOPHIL NFR BLD: 1 % (ref 0.5–7.8)
EPI CELLS #/AREA URNS HPF: ABNORMAL /HPF (ref 0–5)
ERYTHROCYTE [DISTWIDTH] IN BLOOD BY AUTOMATED COUNT: 12.9 % (ref 11.9–14.6)
EST. AVERAGE GLUCOSE BLD GHB EST-MCNC: 129 MG/DL
GLOBULIN SER CALC-MCNC: 3.1 G/DL (ref 2.3–3.5)
GLUCOSE SERPL-MCNC: 91 MG/DL (ref 70–99)
GLUCOSE UR STRIP.AUTO-MCNC: NEGATIVE MG/DL
HBA1C MFR BLD: 6.1 % (ref 0–5.6)
HCT VFR BLD AUTO: 46.2 % (ref 35.8–46.3)
HDLC SERPL-MCNC: 64 MG/DL (ref 40–60)
HDLC SERPL: 3.1 (ref 0–5)
HGB BLD-MCNC: 15.2 G/DL (ref 11.7–15.4)
HGB UR QL STRIP: NEGATIVE
HYALINE CASTS URNS QL MICRO: ABNORMAL /LPF
IMM GRANULOCYTES # BLD AUTO: 0 K/UL (ref 0–0.5)
IMM GRANULOCYTES NFR BLD AUTO: 0 % (ref 0–5)
KETONES UR QL STRIP.AUTO: ABNORMAL MG/DL
LDLC SERPL CALC-MCNC: 110 MG/DL (ref 0–100)
LEUKOCYTE ESTERASE UR QL STRIP.AUTO: NEGATIVE
LYMPHOCYTES # BLD: 1.4 K/UL (ref 0.5–4.6)
LYMPHOCYTES NFR BLD: 24 % (ref 13–44)
MCH RBC QN AUTO: 31.3 PG (ref 26.1–32.9)
MCHC RBC AUTO-ENTMCNC: 32.9 G/DL (ref 31.4–35)
MCV RBC AUTO: 95.1 FL (ref 82–102)
MONOCYTES # BLD: 0.5 K/UL (ref 0.1–1.3)
MONOCYTES NFR BLD: 8 % (ref 4–12)
NEUTS SEG # BLD: 3.6 K/UL (ref 1.7–8.2)
NEUTS SEG NFR BLD: 66 % (ref 43–78)
NITRITE UR QL STRIP.AUTO: NEGATIVE
NRBC # BLD: 0 K/UL (ref 0–0.2)
PH UR STRIP: 5.5 (ref 5–9)
PLATELET # BLD AUTO: 187 K/UL (ref 150–450)
PMV BLD AUTO: 10.6 FL (ref 9.4–12.3)
POTASSIUM SERPL-SCNC: 3.8 MMOL/L (ref 3.5–5.1)
PROT SERPL-MCNC: 7 G/DL (ref 6.3–8.2)
PROT UR STRIP-MCNC: NEGATIVE MG/DL
RBC # BLD AUTO: 4.86 M/UL (ref 4.05–5.2)
RBC #/AREA URNS HPF: ABNORMAL /HPF (ref 0–5)
SODIUM SERPL-SCNC: 141 MMOL/L (ref 136–145)
SP GR UR REFRACTOMETRY: 1.03 (ref 1–1.02)
TRIGL SERPL-MCNC: 119 MG/DL (ref 0–150)
TSH W FREE THYROID IF ABNORMAL: 2.03 UIU/ML (ref 0.27–4.2)
URINE CULTURE IF INDICATED: ABNORMAL
UROBILINOGEN UR QL STRIP.AUTO: 0.2 EU/DL (ref 0.2–1)
VLDLC SERPL CALC-MCNC: 24 MG/DL (ref 6–23)
WBC # BLD AUTO: 5.6 K/UL (ref 4.3–11.1)
WBC URNS QL MICRO: ABNORMAL /HPF (ref 0–4)

## 2024-10-15 NOTE — PROGRESS NOTES
Medicare Annual Wellness Visit    Mariya Milner is here for Medicare AWV (Pt presents for AWV and states she was dx with alzheimer. )    Assessment & Plan   Medicare annual wellness visit, subsequent  Routine general medical examination at a health care facility  -     CBC with Auto Differential; Future  -     Comprehensive Metabolic Panel; Future  -     Lipid Panel; Future  -     TSH with Reflex; Future  -     Urinalysis with Reflex to Culture; Future  -     Hemoglobin A1C; Future  Alzheimer disease (HCC)  Primary hypertension  -     CBC with Auto Differential; Future  -     Comprehensive Metabolic Panel; Future  -     TSH with Reflex; Future  -     Urinalysis with Reflex to Culture; Future  Hyperlipidemia, unspecified hyperlipidemia type  -     Lipid Panel; Future  Acquired hypothyroidism  -     TSH with Reflex; Future  Acute pain of right shoulder  -     XR SHOULDER RIGHT (MIN 2 VIEWS); Future  -     [facility], Sports Medicine  Prediabetes  -     Hemoglobin A1C; Future  Estrogen deficiency  -     DEXA BONE DENSITY AXIAL SKELETON; Future  Asymptomatic menopausal state  -     DEXA BONE DENSITY AXIAL SKELETON; Future  Encounter for screening mammogram for breast cancer  -     [facility] CHARAN DIGITAL SCREEN BILATERAL; Future  Need for prophylactic vaccination against Streptococcus pneumoniae (pneumococcus)  -     Pneumococcal, PCV20, PREVNAR 20, (age 6w+), IM, PF     Check annual labs today.     Continue seeing Neurology for management of Alzheimer's. Remain on current meds.     Obtain Xray of R shoulder. Referral placed to Ortho for further eval of R shoulder/arm pain. Take Naproxen prn.     HTN, HLD, and hypothyroidism stable, continue current medications.     Dietary recommendations made to prevent progression of prediabetes to diabetes. Continue regular exercise. Limits carbs in diet, fruits okay in moderation. Prioritize protein.     Obtain Mammogram and Dexa scan this year.

## 2024-10-18 ENCOUNTER — HOSPITAL ENCOUNTER (OUTPATIENT)
Dept: GENERAL RADIOLOGY | Age: 64
Discharge: HOME | End: 2024-10-20
Payer: MEDICARE

## 2024-10-18 DIAGNOSIS — M25.511 ACUTE PAIN OF RIGHT SHOULDER: ICD-10-CM

## 2024-10-18 PROCEDURE — 73030 X-RAY EXAM OF SHOULDER: CPT

## 2024-10-28 RX ORDER — MEMANTINE HYDROCHLORIDE 10 MG/1
10 TABLET ORAL 2 TIMES DAILY
Qty: 180 TABLET | Refills: 3 | Status: SHIPPED | OUTPATIENT
Start: 2024-10-28

## 2024-11-13 ENCOUNTER — OFFICE VISIT (OUTPATIENT)
Dept: NEUROLOGY | Age: 64
End: 2024-11-13
Payer: MEDICARE

## 2024-11-13 DIAGNOSIS — F02.A0 MILD ALZHEIMER'S DEMENTIA OF OTHER ONSET, WITHOUT BEHAVIORAL DISTURBANCE, PSYCHOTIC DISTURBANCE, MOOD DISTURBANCE, OR ANXIETY (HCC): Primary | ICD-10-CM

## 2024-11-13 DIAGNOSIS — G30.8 MILD ALZHEIMER'S DEMENTIA OF OTHER ONSET, WITHOUT BEHAVIORAL DISTURBANCE, PSYCHOTIC DISTURBANCE, MOOD DISTURBANCE, OR ANXIETY (HCC): Primary | ICD-10-CM

## 2024-11-13 PROCEDURE — 1160F RVW MEDS BY RX/DR IN RCRD: CPT | Performed by: PSYCHIATRY & NEUROLOGY

## 2024-11-13 PROCEDURE — G8484 FLU IMMUNIZE NO ADMIN: HCPCS | Performed by: PSYCHIATRY & NEUROLOGY

## 2024-11-13 PROCEDURE — G8400 PT W/DXA NO RESULTS DOC: HCPCS | Performed by: PSYCHIATRY & NEUROLOGY

## 2024-11-13 PROCEDURE — G8417 CALC BMI ABV UP PARAM F/U: HCPCS | Performed by: PSYCHIATRY & NEUROLOGY

## 2024-11-13 PROCEDURE — G8427 DOCREV CUR MEDS BY ELIG CLIN: HCPCS | Performed by: PSYCHIATRY & NEUROLOGY

## 2024-11-13 PROCEDURE — 1090F PRES/ABSN URINE INCON ASSESS: CPT | Performed by: PSYCHIATRY & NEUROLOGY

## 2024-11-13 PROCEDURE — 1036F TOBACCO NON-USER: CPT | Performed by: PSYCHIATRY & NEUROLOGY

## 2024-11-13 PROCEDURE — 1159F MED LIST DOCD IN RCRD: CPT | Performed by: PSYCHIATRY & NEUROLOGY

## 2024-11-13 PROCEDURE — 99214 OFFICE O/P EST MOD 30 MIN: CPT | Performed by: PSYCHIATRY & NEUROLOGY

## 2024-11-13 PROCEDURE — 1123F ACP DISCUSS/DSCN MKR DOCD: CPT | Performed by: PSYCHIATRY & NEUROLOGY

## 2024-11-13 PROCEDURE — 3017F COLORECTAL CA SCREEN DOC REV: CPT | Performed by: PSYCHIATRY & NEUROLOGY

## 2024-11-13 ASSESSMENT — ENCOUNTER SYMPTOMS
EYES NEGATIVE: 1
GASTROINTESTINAL NEGATIVE: 1
RESPIRATORY NEGATIVE: 1
ALLERGIC/IMMUNOLOGIC NEGATIVE: 1

## 2024-11-13 NOTE — PROGRESS NOTES
to the Timeline version of the [employer] SmartLink.       There were no vitals filed for this visit.     Physical Exam  Constitutional:       Appearance: Normal appearance.   HENT:      Head: Normocephalic and atraumatic.   Eyes:      Extraocular Movements: Extraocular movements intact and EOM normal.      Pupils: Pupils are equal, round, and reactive to light.   Cardiovascular:      Rate and Rhythm: Normal rate.      Pulses: Normal pulses.   Pulmonary:      Effort: Pulmonary effort is normal.   Abdominal:      General: Abdomen is flat.   Neurological:      Mental Status: She is alert and oriented to person, place, and time.      Gait: Gait is intact.      Deep Tendon Reflexes:      Reflex Scores:       Tricep reflexes are 1+ on the right side and 1+ on the left side.       Bicep reflexes are 1+ on the right side and 1+ on the left side.       Brachioradialis reflexes are 1+ on the right side and 1+ on the left side.       Patellar reflexes are 1+ on the right side and 1+ on the left side.       Achilles reflexes are 1+ on the right side and 1+ on the left side.         Neurologic Exam     Mental Status   Oriented to person, place, and time.   Attention: normal. Concentration: normal.   Level of consciousness: alert  Knowledge: poor and good.   25/30 moca     Cranial Nerves     CN II   Visual fields full to confrontation.     CN III, IV, VI   Pupils are equal, round, and reactive to light.  Extraocular motions are normal.     CN VII   Facial expression full, symmetric.     Motor Exam   Right arm tone: normal  Left arm tone: normal  Right leg tone: normal  Left leg tone: normal    Gait, Coordination, and Reflexes     Gait  Gait: normal    Tremor   Resting tremor: absent  Intention tremor: absent  Action tremor: absent    Reflexes   Right brachioradialis: 1+  Left brachioradialis: 1+  Right biceps: 1+  Left biceps: 1+  Right triceps: 1+  Left triceps: 1+  Right patellar: 1+  Left patellar: 1+  Right achilles: 1+  Left

## 2024-11-14 RX ORDER — DONEPEZIL HYDROCHLORIDE 10 MG/1
10 TABLET, FILM COATED ORAL NIGHTLY
Qty: 90 TABLET | Refills: 3 | Status: SHIPPED | OUTPATIENT
Start: 2024-11-14

## 2024-11-21 ENCOUNTER — OFFICE VISIT (OUTPATIENT)
Dept: ORTHOPEDIC SURGERY | Age: 64
End: 2024-11-21

## 2024-11-21 DIAGNOSIS — M25.611 DECREASED RANGE OF MOTION OF SHOULDER, RIGHT: ICD-10-CM

## 2024-11-21 DIAGNOSIS — M25.511 RIGHT SHOULDER PAIN, UNSPECIFIED CHRONICITY: Primary | ICD-10-CM

## 2024-11-21 RX ORDER — METHYLPREDNISOLONE ACETATE 80 MG/ML
80 INJECTION, SUSPENSION INTRA-ARTICULAR; INTRALESIONAL; INTRAMUSCULAR; SOFT TISSUE ONCE
Status: COMPLETED | OUTPATIENT
Start: 2024-11-21 | End: 2024-11-21

## 2024-11-21 RX ADMIN — METHYLPREDNISOLONE ACETATE 80 MG: 80 INJECTION, SUSPENSION INTRA-ARTICULAR; INTRALESIONAL; INTRAMUSCULAR; SOFT TISSUE at 14:26

## 2024-11-21 NOTE — PROGRESS NOTES
Name: Mariya Milner  YOB: 1960  Gender: female  MRN: [id number]  Date of Encounter:  11/21/2024       CHIEF COMPLAINT:     Chief Complaint   Patient presents with    Shoulder Pain     Right        SUBJECTIVE/OBJECTIVE:      HPI:    Mariya Milner  is a 64 y.o. pleasant female with a hx of HTN, alzheimers, hypothyroidism who presents today for a new evaluation of her right shoulder pain. She is accompanied by her  who assists in hx.    She has a hx of reported adhesive capsulitis and improved with PT. She has had around 6 months of discomfort and poor range of motion, with overhead movement and reaching behind her back. She feels it is somewhat different than her prior condition. No recent injections or surgery. She is taking antiinflammatories occasionally for pain. She is RHD. Denies paresthesias.      PAST HISTORY:   Past medical, surgical, family, social history and allergies reviewed by me.   Tobacco use:  reports that she has never smoked. She has never used smokeless tobacco.  Diabetes: none  Hemoglobin A1C   Date Value Ref Range Status   10/15/2024 6.1 (H) 0 - 5.6 % Final     Comment:     Reference Range  Normal       <5.7%  Prediabetes  5.7-6.4%  Diabetes     >6.4%           REVIEW OF SYSTEMS:   As noted in HPI.     PHYSICAL EXAMINATION:     Gen: Well-developed, no acute distress   HEENT: NC/AT, EOMI   Neck: Trachea midline, normal ROM   CV: Regular rhythm by palpation of distal pulse, normal capillary refill   Pulm: No respiratory distress, no stridor   Psychiatric: Well oriented, normal mood and affect.   Skin: No rashes, lesions or ulcers, normal temperature, turgor, and texture on uninvolved extremity.      ORTHO EXAM:    Right Shoulder:     Inspection: no biceps deformity; no notable atrophy or erythema  ROM active  passive:  / 110. Abduction 80 / 110.  Ex rotation 70 . Int rotation: back pocket  Tenderness: No tenderness  Strength: Abduction 4/5, External rotation

## 2024-11-27 ENCOUNTER — HOSPITAL ENCOUNTER (OUTPATIENT)
Dept: PHYSICAL THERAPY | Age: 64
Setting detail: RECURRING SERIES
Discharge: HOME | End: 2024-11-30
Attending: STUDENT IN AN ORGANIZED HEALTH CARE EDUCATION/TRAINING PROGRAM
Payer: MEDICARE

## 2024-11-27 DIAGNOSIS — M25.511 RIGHT SHOULDER PAIN, UNSPECIFIED CHRONICITY: Primary | ICD-10-CM

## 2024-11-27 DIAGNOSIS — M25.611 DECREASED RANGE OF MOTION OF SHOULDER, RIGHT: ICD-10-CM

## 2024-11-27 PROCEDURE — 97110 THERAPEUTIC EXERCISES: CPT

## 2024-11-27 PROCEDURE — 97161 PT EVAL LOW COMPLEX 20 MIN: CPT

## 2024-11-27 ASSESSMENT — PAIN SCALES - GENERAL: PAINLEVEL_OUTOF10: 3

## 2024-11-27 NOTE — THERAPY EVALUATION
Mariya Milner  : 1960  Primary: Medicare Part A And B (Medicare)  Secondary: [employer] SENIOR MEDICARE SUPP [facility] [place] [address] [place] [address]  Phone: [phone]  Fax: [phone] Plan Frequency: 2x a week for 8weeks  Plan of Care/Certification Expiration Date: 25        Plan of Care/Certification Expiration Date:  Plan of Care/Certification Expiration Date: 25    Frequency/Duration: Plan Frequency: 2x a week for 8weeks      Time In/Out:   Time In: 0930  Time Out: 1030    PT Visit Info:    Plan Frequency: 2x a week for 8weeks      Visit Count:  1                OUTPATIENT PHYSICAL THERAPY:             Initial Assessment 2024                 Episode (Right shoulder pain, Ad Cap)         Treatment Diagnosis:     Right shoulder pain, unspecified chronicity  Decreased range of motion of shoulder, right  Medical/Referring Diagnosis:    Right shoulder pain, unspecified chronicity [M25.511]  Decreased range of motion of shoulder, right [M25.611]      Referring Physician:  Ana Ornelas MD MD Orders:  PT Eval and Treat   Return MD Appt:  2025  Date of Onset:  Onset Date:  (about 3 months ago)     Allergies:  Sulfa antibiotics  Restrictions/Precautions:    None      Medications Last Reviewed:  2024     SUBJECTIVE   History of Injury/Illness (Reason for Referral):  Patient reports insidious on set of right shoulder pain and loss of ROM about 3 months ago. She states that she had the same adhesive capsulitis in same shoulder in  requiring 30+ visits of PT.  Patient Stated Goal(s):  \"Normal use of arm without pain\"  Initial Pain Level:     3/10   Post Session Pain Level:     2/10  Past Medical History/Comorbidities:   Ms. Milner  has a past medical history of Hypercholesterolemia and Hypertension.  Ms. Milner  has a past surgical history that includes gyn and  section (9/15/87).  Social History/Living Environment:

## 2024-11-27 NOTE — PROGRESS NOTES
Mariya Milner  : 1960  Primary: Medicare Part A And B (Medicare)  Secondary: [employer] SENIOR MEDICARE SUPP [facility] [place] [address] [place] [address]  Phone: [phone]  Fax: [phone] Plan Frequency: 2x a week for 8weeks  Plan of Care/Certification Expiration Date: 25        Plan of Care/Certification Expiration Date:  Plan of Care/Certification Expiration Date: 25    Frequency/Duration: Plan Frequency: 2x a week for 8weeks      Time In/Out:   Time In: 0930  Time Out: 1030    PT Visit Info:    Plan Frequency: 2x a week for 8weeks      Visit Count:  1    OUTPATIENT PHYSICAL THERAPY:   Treatment Note 2024       Charge Capture   Episode  (Right shoulder pain, Ad Cap)               Treatment Diagnosis:    Right shoulder pain, unspecified chronicity  Decreased range of motion of shoulder, right  Medical/Referring Diagnosis:    Right shoulder pain, unspecified chronicity [M25.511]  Decreased range of motion of shoulder, right [M25.611]      Referring Physician:  Ana Ornelas MD MD Orders:  PT Eval and Treat   Return MD Appt:  2024   Date of Onset:  Onset Date:  (about 3 months ago)     Allergies:   Sulfa antibiotics  Restrictions/Precautions:   None      Interventions Planned (Treatment may consist of any combination of the following):     See Assessment Note      Subjective Comments:     See Eval  Initial Pain Level::     3/10  Post Session Pain Level:       2/10  Medications Last Reviewed:  2024  Updated Objective Findings:  See Evaluation Note from today  Treatment   THERAPEUTIC EXERCISE: (15 minutes):    Exercises per grid below to improve mobility, strength, balance, coordination, and dynamic movement of generalized knee to improve functional bending, lifting, and carrying.  Required minimal visual, verbal, and manual cues to promote proper body alignment, promote proper body posture, and promote proper body mechanics.

## 2024-12-03 ENCOUNTER — HOSPITAL ENCOUNTER (OUTPATIENT)
Dept: PHYSICAL THERAPY | Age: 64
Setting detail: RECURRING SERIES
Discharge: HOME | End: 2024-12-06
Attending: STUDENT IN AN ORGANIZED HEALTH CARE EDUCATION/TRAINING PROGRAM
Payer: MEDICARE

## 2024-12-03 PROCEDURE — 97110 THERAPEUTIC EXERCISES: CPT

## 2024-12-03 PROCEDURE — 97140 MANUAL THERAPY 1/> REGIONS: CPT

## 2024-12-03 NOTE — PROGRESS NOTES
below to improve mobility, strength, balance, coordination, and dynamic movement of generalized knee to improve functional bending, lifting, and carrying.  Required minimal visual, verbal, and manual cues to promote proper body alignment, promote proper body posture, and promote proper body mechanics.  Progressed resistance, range, repetitions, and complexity of movement as indicated.    Date:  11/27/2024 Date:   12/3/24 Date:      Activity/Exercise Parameters Parameters Parameters   Shoulder ROM 6 min  6 min     UBE         Rings         UT LS Stretch    seated 3 reps hold 30 sec right side     Posterior capsule stretch [id number]  3X 20 sec     IR stretch Towel [id number]       Wand Flexion ER Supine 10x ea  Supine 2X20     Wand Flexion  Supine 2X10    Scapular retraction 20xs  seated X20      Right shoulder self mob. inferior glide    seated holding to mat and shifting trunk to the left hold 30 sec X2 reps       Pendulums    1 min                                          MANUAL THERAPY: (26minutes):   Joint mobilization and Soft tissue mobilization was utilized and necessary because of the patient's restricted joint motion, painful spasm, loss of articular motion, and restricted motion of soft tissue.         Date  11/27/2024     Technique Used Grade  Level # Time(s) Effect while being performed    right GH joint  inferior glide  II   2X 10     improve joint capsule   mobilization    right GH posterior glide  II    2X10   improve joint capsule   mobilization                                                                Strumming and sustained pressure on right pectoralis to reduce muscle spasms   Strumming right and release technique to right upper trap       Treatment/Session Summary:    Treatment Assessment: Pt tolerated treatment very well today. She had a reduction in her right upper trap and pectoralis spasms following manual and improved GH joint mobility following joint mobilization techniques. Following PT

## 2024-12-06 ENCOUNTER — HOSPITAL ENCOUNTER (OUTPATIENT)
Dept: MAMMOGRAPHY | Age: 64
Discharge: HOME | End: 2024-12-09
Payer: MEDICARE

## 2024-12-06 VITALS — WEIGHT: 147 LBS | HEIGHT: 60 IN | BODY MASS INDEX: 28.86 KG/M2

## 2024-12-06 DIAGNOSIS — Z78.0 ASYMPTOMATIC MENOPAUSAL STATE: ICD-10-CM

## 2024-12-06 DIAGNOSIS — Z12.31 ENCOUNTER FOR SCREENING MAMMOGRAM FOR BREAST CANCER: ICD-10-CM

## 2024-12-06 DIAGNOSIS — E28.39 ESTROGEN DEFICIENCY: ICD-10-CM

## 2024-12-06 PROCEDURE — 77063 BREAST TOMOSYNTHESIS BI: CPT

## 2024-12-06 PROCEDURE — 77080 DXA BONE DENSITY AXIAL: CPT

## 2024-12-16 ENCOUNTER — HOSPITAL ENCOUNTER (OUTPATIENT)
Dept: PHYSICAL THERAPY | Age: 64
Setting detail: RECURRING SERIES
Discharge: HOME | End: 2024-12-19
Attending: STUDENT IN AN ORGANIZED HEALTH CARE EDUCATION/TRAINING PROGRAM
Payer: MEDICARE

## 2024-12-16 PROCEDURE — 97110 THERAPEUTIC EXERCISES: CPT

## 2024-12-16 NOTE — PROGRESS NOTES
Mariya Milner  : 1960  Primary: Medicare Part A And B (Medicare)  Secondary: AET[employer] SENIOR MEDICARE SUPP [facility] @ [place] [address] [place] [address]  Phone: [phone]  Fax: [phone] Plan Frequency: 2x a week for 8weeks    Plan of Care/Certification Expiration Date: 25        Plan of Care/Certification Expiration Date:  Plan of Care/Certification Expiration Date: 25    Frequency/Duration:   Plan Frequency: 2x a week for 8weeks      Time In/Out:   Time In: 1300  Time Out: 1345      PT Visit Info:         Visit Count:  3    OUTPATIENT PHYSICAL THERAPY:   Treatment Note 2024       Episode  (Right shoulder pain, Ad Cap)               Treatment Diagnosis:    Right shoulder pain, unspecified chronicity  Decreased range of motion of shoulder, right  Medical/Referring Diagnosis:    Right shoulder pain, unspecified chronicity  Decreased range of motion of shoulder, right      Referring Physician:  Ana Ornelas MD MD Orders:  PT Eval and Treat     Date of Onset:  Onset Date:  (about 3 months ago)     Allergies:   Sulfa antibiotics  Restrictions/Precautions:   None      Interventions Planned (Treatment may consist of any combination of the following):     See Assessment Note    Subjective Comments: Patient reports feeling better    Initial Pain Level::   2   /10  Post Session Pain Level:     2   /10  Medications Last Reviewed:  2024    Updated Objective Findings:    None today    Mild spasms at right upper trap and at pectoralis      Treatment     Treatment   THERAPEUTIC EXERCISE: (38 minutes):    Exercises per grid below to improve mobility, strength, balance, coordination, and dynamic movement of generalized knee to improve functional bending, lifting, and carrying.  Required minimal visual, verbal, and manual cues to promote proper body alignment, promote proper body posture, and promote proper body mechanics.  Progressed

## 2024-12-19 ENCOUNTER — HOSPITAL ENCOUNTER (OUTPATIENT)
Dept: PHYSICAL THERAPY | Age: 64
Setting detail: RECURRING SERIES
Discharge: HOME | End: 2024-12-22
Attending: STUDENT IN AN ORGANIZED HEALTH CARE EDUCATION/TRAINING PROGRAM
Payer: MEDICARE

## 2024-12-19 PROCEDURE — 97110 THERAPEUTIC EXERCISES: CPT

## 2024-12-19 NOTE — PROGRESS NOTES
Mariya Milner  : 1960  Primary: Medicare Part A And B (Medicare)  Secondary: A[employer] SENIOR MEDICARE SUPP [facility] @ [place] [address] [address] [place] [address]  Phone: [phone]  Fax: [phone] Plan Frequency: 2x a week for 8weeks    Plan of Care/Certification Expiration Date: 25        Plan of Care/Certification Expiration Date:  Plan of Care/Certification Expiration Date: 25    Frequency/Duration:   Plan Frequency: 2x a week for 8weeks      Time In/Out:   Time In: 1300  Time Out: 1345      PT Visit Info:         Visit Count:  4    OUTPATIENT PHYSICAL THERAPY:   Treatment Note 2024       Episode  (Right shoulder pain, Ad Cap)               Treatment Diagnosis:    Right shoulder pain, unspecified chronicity  Decreased range of motion of shoulder, right  Medical/Referring Diagnosis:          Referring Physician:  Ana Ornelas MD MD Orders:  PT Eval and Treat     Date of Onset:  Onset Date:  (about 3 months ago)     Allergies:   Sulfa antibiotics  Restrictions/Precautions:   None      Interventions Planned (Treatment may consist of any combination of the following):     See Assessment Note    Subjective Comments: Patient reports improved ROM and less pain    Initial Pain Level::   2   /10  Post Session Pain Level:     2   /10  Medications Last Reviewed:  2024    Updated Objective Findings:    None today    Mild spasms at right upper trap and at pectoralis      Treatment     Treatment   THERAPEUTIC EXERCISE: (38 minutes):    Exercises per grid below to improve mobility, strength, balance, coordination, and dynamic movement of generalized knee to improve functional bending, lifting, and carrying.  Required minimal visual, verbal, and manual cues to promote proper body alignment, promote proper body posture, and promote proper body mechanics.  Progressed resistance, range, repetitions, and complexity of movement as indicated.

## 2024-12-30 ENCOUNTER — HOSPITAL ENCOUNTER (OUTPATIENT)
Dept: PHYSICAL THERAPY | Age: 64
Setting detail: RECURRING SERIES
Discharge: HOME | End: 2025-01-02
Attending: STUDENT IN AN ORGANIZED HEALTH CARE EDUCATION/TRAINING PROGRAM
Payer: MEDICARE

## 2024-12-30 PROCEDURE — 97110 THERAPEUTIC EXERCISES: CPT

## 2024-12-30 NOTE — PROGRESS NOTES
Mariya Milner  : 1960  Primary: Medicare Part A And B (Medicare)  Secondary: AET[employer] SENIOR MEDICARE SUPP [facility] @ [place] [address] [address] [place] [address]  Phone: [phone]  Fax: [phone] Plan Frequency: 2x a week for 8weeks    Plan of Care/Certification Expiration Date: 25        Plan of Care/Certification Expiration Date:  Plan of Care/Certification Expiration Date: 25    Frequency/Duration:   Plan Frequency: 2x a week for 8weeks      Time In/Out:   Time In: 1300  Time Out: 1345      PT Visit Info:         Visit Count:  5    OUTPATIENT PHYSICAL THERAPY:   Treatment Note 2024       Episode  (Right shoulder pain, Ad Cap)               Treatment Diagnosis:    Right shoulder pain, unspecified chronicity  Decreased range of motion of shoulder, right  Medical/Referring Diagnosis:    Right shoulder pain, unspecified chronicity  Decreased range of motion of shoulder, right      Referring Physician:  Ana Ornelas MD MD Orders:  PT Eval and Treat     Date of Onset:  Onset Date:  (about 3 months ago)     Allergies:   Sulfa antibiotics  Restrictions/Precautions:   None      Interventions Planned (Treatment may consist of any combination of the following):     See Assessment Note    Subjective Comments: Patient reports continued improvement     Initial Pain Level::   2   /10  Post Session Pain Level:     2   /10  Medications Last Reviewed:  2024    Updated Objective Findings:    None today    Mild spasms at right upper trap and at pectoralis      Treatment     Treatment   THERAPEUTIC EXERCISE: (40 minutes):    Exercises per grid below to improve mobility, strength, balance, coordination, and dynamic movement of generalized knee to improve functional bending, lifting, and carrying.  Required minimal visual, verbal, and manual cues to promote proper body alignment, promote proper body posture, and promote proper body mechanics.  Progressed

## 2025-01-02 ENCOUNTER — HOSPITAL ENCOUNTER (OUTPATIENT)
Dept: PHYSICAL THERAPY | Age: 65
Setting detail: RECURRING SERIES
Discharge: HOME | End: 2025-01-05
Attending: STUDENT IN AN ORGANIZED HEALTH CARE EDUCATION/TRAINING PROGRAM
Payer: MEDICARE

## 2025-01-02 PROCEDURE — 97110 THERAPEUTIC EXERCISES: CPT

## 2025-01-07 ENCOUNTER — HOSPITAL ENCOUNTER (OUTPATIENT)
Dept: PHYSICAL THERAPY | Age: 65
Setting detail: RECURRING SERIES
Discharge: HOME | End: 2025-01-10
Attending: STUDENT IN AN ORGANIZED HEALTH CARE EDUCATION/TRAINING PROGRAM
Payer: MEDICARE

## 2025-01-07 PROCEDURE — 97110 THERAPEUTIC EXERCISES: CPT

## 2025-01-07 NOTE — PROGRESS NOTES
Strumming and sustained pressure on right pectoralis to reduce muscle spasms   Strumming right and release technique to right upper trap       Treatment/Session Summary:    Treatment Assessment: Patient had improved tolerance to strengthening activity        Communication/Consultation:  None  Equipment provided today:  HEP  Recommendations/Intent for next treatment session: Next visit will focus on ROM and progression of postural strengthening.    >Total Treatment Billable Duration:  40 minutes   Time In: 1300  Time Out: 1345    Pablo Aldrich PT         Charge Capture  Events  [employer] Portal  Appt Desk  Attendance Report     Future Appointments   Date Time Provider Department Center   1/9/2025  1:00 PM Pablo Aldrich, PT [facility]   1/13/2025  1:15 PM Ana Ornelas MD [facility]   1/14/2025  1:00 PM Pablo Aldrich, PT [facility]   1/16/2025  1:00 PM Pablo Aldrich, PT [facility]   1/21/2025  1:00 PM Pablo Aldrich, PT [facility]   1/23/2025  1:00 PM Pablo Aldrich, PT [facility]   1/28/2025  1:00 PM Pablo Aldrich, PT [facility]   1/30/2025  1:00 PM Pablo Aldrich, PT [facility]   8/13/2025  8:00 AM Sha Uriarte DO [facility] AMB   10/10/2025  8:40 AM [facility] LAB RESOURCE [facility] E[facility] DEP   10/17/2025 10:00 AM Shyanne Gomez MD [facility] DEP

## 2025-01-09 ENCOUNTER — HOSPITAL ENCOUNTER (OUTPATIENT)
Dept: PHYSICAL THERAPY | Age: 65
Setting detail: RECURRING SERIES
Discharge: HOME | End: 2025-01-12
Attending: STUDENT IN AN ORGANIZED HEALTH CARE EDUCATION/TRAINING PROGRAM
Payer: MEDICARE

## 2025-01-09 PROCEDURE — 97110 THERAPEUTIC EXERCISES: CPT

## 2025-01-09 NOTE — PROGRESS NOTES
Mariya Milner  : 1960  Primary: Medicare Part A And B (Medicare)  Secondary: [employer] SENIOR MEDICARE SUPP [facility] @ [place] [address] [address] [place] [address]  Phone: [phone]  Fax: [phone] Plan Frequency: 2x a week for 8weeks    Plan of Care/Certification Expiration Date: 25        Plan of Care/Certification Expiration Date:  Plan of Care/Certification Expiration Date: 25    Frequency/Duration:   Plan Frequency: 2x a week for 8weeks      Time In/Out:   Time In: 1300  Time Out: 1345      PT Visit Info:         Visit Count:  8    OUTPATIENT PHYSICAL THERAPY:   Treatment Note 2025       Episode  (Right shoulder pain, Ad Cap)               Treatment Diagnosis:    Right shoulder pain, unspecified chronicity  Decreased range of motion of shoulder, right  Medical/Referring Diagnosis:    Right shoulder pain, unspecified chronicity  Decreased range of motion of shoulder, right      Referring Physician:  Ana Ornelas MD MD Orders:  PT Eval and Treat     Date of Onset:  Onset Date:  (about 3 months ago)     Allergies:   Sulfa antibiotics  Restrictions/Precautions:   None      Interventions Planned (Treatment may consist of any combination of the following):     See Assessment Note    Subjective Comments: Patient reports doing much better with ROM    Initial Pain Level::   sore   /10  Post Session Pain Level:     sore   /10  Medications Last Reviewed:  2025    Updated Objective Findings:    None today          Treatment     Treatment   THERAPEUTIC EXERCISE: (40 minutes):    Exercises per grid below to improve mobility, strength, balance, coordination, and dynamic movement of generalized knee to improve functional bending, lifting, and carrying.  Required minimal visual, verbal, and manual cues to promote proper body alignment, promote proper body posture, and promote proper body mechanics.  Progressed resistance, range, repetitions, and

## 2025-01-09 NOTE — PROGRESS NOTES
Mariya Milner  : 1960  Primary: Medicare Part A And B  Secondary: [unfilled] [facility] at [place] [address] [address] [place] [address]  [phone]   [phone]      OUTPATIENT PHYSICAL THERAPY: PHYSICIAN COMMUNICATION    REFERRING PHYSICIAN: Ana Ornelas MD    Return Physician Appointment: 2025  1:15 pm    MEDICAL/REFERRING DIAGNOSIS:  Right shoulder pain, unspecified chronicity [M25.511]  Decreased range of motion of shoulder, right [M25.611]    ATTENDANCE: Mariya Milner has attended 8 sessions of therapy from 2024 to 2025.    ASSESSMENT:DATE: 2025    PROGRESS: Mariya Milner has made good progress with AROM as noted below and has less pain with activity. She continues to has tightness of right GH in a capsular pattern limiting return to overhead reaching.    AROM RIGHT (degrees)  2024 RIGHT  (degrees)  2025   Shoulder Flexion 89 125   Shoulder Abduction 55 95   Shoulder Functional ER  C4  C7   Shoulder Functional IR  R hip  L3   Elbow Flexion WNL WNL   Elbow Extension WNL WNL              RECOMMENDATIONS: Continue therapy 2 times a week through certification period until goals are met.    Thank you for this referral, and please do not hesitate to contact me at the number listed above if you have any questions.    Anil Aldrich, PT, MSR

## 2025-01-13 ENCOUNTER — OFFICE VISIT (OUTPATIENT)
Dept: ORTHOPEDIC SURGERY | Age: 65
End: 2025-01-13
Payer: MEDICARE

## 2025-01-13 DIAGNOSIS — M25.511 RIGHT SHOULDER PAIN, UNSPECIFIED CHRONICITY: Primary | ICD-10-CM

## 2025-01-13 PROCEDURE — 99213 OFFICE O/P EST LOW 20 MIN: CPT | Performed by: STUDENT IN AN ORGANIZED HEALTH CARE EDUCATION/TRAINING PROGRAM

## 2025-01-13 PROCEDURE — 1123F ACP DISCUSS/DSCN MKR DOCD: CPT | Performed by: STUDENT IN AN ORGANIZED HEALTH CARE EDUCATION/TRAINING PROGRAM

## 2025-01-13 PROCEDURE — G8399 PT W/DXA RESULTS DOCUMENT: HCPCS | Performed by: STUDENT IN AN ORGANIZED HEALTH CARE EDUCATION/TRAINING PROGRAM

## 2025-01-13 PROCEDURE — G8417 CALC BMI ABV UP PARAM F/U: HCPCS | Performed by: STUDENT IN AN ORGANIZED HEALTH CARE EDUCATION/TRAINING PROGRAM

## 2025-01-13 PROCEDURE — 3017F COLORECTAL CA SCREEN DOC REV: CPT | Performed by: STUDENT IN AN ORGANIZED HEALTH CARE EDUCATION/TRAINING PROGRAM

## 2025-01-13 PROCEDURE — 1090F PRES/ABSN URINE INCON ASSESS: CPT | Performed by: STUDENT IN AN ORGANIZED HEALTH CARE EDUCATION/TRAINING PROGRAM

## 2025-01-13 PROCEDURE — 1036F TOBACCO NON-USER: CPT | Performed by: STUDENT IN AN ORGANIZED HEALTH CARE EDUCATION/TRAINING PROGRAM

## 2025-01-13 PROCEDURE — G8428 CUR MEDS NOT DOCUMENT: HCPCS | Performed by: STUDENT IN AN ORGANIZED HEALTH CARE EDUCATION/TRAINING PROGRAM

## 2025-01-13 NOTE — PROGRESS NOTES
Name: Mariya Milner  YOB: 1960  Gender: female  MRN: [id number]  Date of Encounter:  1/13/2025       CHIEF COMPLAINT:     Chief Complaint   Patient presents with    Follow-up     Right Shoulder        SUBJECTIVE/OBJECTIVE:      HPI:    Patient is a 64 y.o. pleasant female who presents today for a return evaluation of her right shoulder.    Working diagnosis:   1. Right shoulder pain, unspecified chronicity       LOV: 11/21/2024     Recall hx:   Ms. Milner has a hx of HTN, alzheimers, hypothyroidism who presents today for a new evaluation of her right shoulder pain. She is accompanied by her  who assists in hx.     She has a hx of reported adhesive capsulitis and improved with PT. She has had around 6 months of discomfort and poor range of motion, with overhead movement and reaching behind her back. She feels it is somewhat different than her prior condition. No recent injections or surgery. She is taking antiinflammatories occasionally for pain. She is RHD. Denies paresthesias.    On exam she has decreased range of motion concerning for adhesive capsulitis, though she reports a history of this in the same shoulder with recovery.  GH joint injection    11/21/2024: Performed.  PT referral placed.  1/13/25: She has participated in 8 therapy visits thus far with improvement in pain.  She did not get significant relief with the injection aside from that afternoon but she has noted significant improvement in pain and mobility with her physical therapy.  She is pleased.       PAST HISTORY:   Past medical, surgical, family, social history and allergies reviewed by me. Unchanged from prior visit.     REVIEW OF SYSTEMS:   As noted in HPI.     PHYSICAL EXAMINATION:     Gen: Well-developed, no acute distress   HEENT: NC/AT, EOMI   Neck: Trachea midline, normal ROM   CV: Regular rhythm by palpation of distal pulse, normal capillary refill   Pulm: No respiratory distress, no stridor   Psychiatric: Well

## 2025-01-14 ENCOUNTER — HOSPITAL ENCOUNTER (OUTPATIENT)
Dept: PHYSICAL THERAPY | Age: 65
Setting detail: RECURRING SERIES
Discharge: HOME | End: 2025-01-17
Attending: STUDENT IN AN ORGANIZED HEALTH CARE EDUCATION/TRAINING PROGRAM
Payer: MEDICARE

## 2025-01-14 PROCEDURE — 97110 THERAPEUTIC EXERCISES: CPT

## 2025-01-14 NOTE — PROGRESS NOTES
complexity of movement as indicated.      Date:   12/19/2024 Date:   12/30/2024 Date:   1/2/2025 Date:   1/7/2025 Date:   1/9/2025 Date:   1/14/2025   Activity/Exercise         Shoulder ROM 6min  6min       UBE 3/3 L4 3/3 L4 4/4 L4 4/4 L4 4/4 L4 4/4 L4   Rings   L 94q86ybe L 28z78jbr L 89b05mir L 17l65ihd   UT LS Stretch         Posterior capsule stretch 2v35upq 5e17axj 5v15uev 5i31bze 2h27qzq 0a48rbz   Pulley 3min    3min    IR stretch Pulley 82x33osz Pulley 13z92mgj Pulley 24f42bbt Strap 00i52hys Strap 76p85faw Pulley 24c36juk   Doorway stretch   2 way 1k55spr ea 2 way 0a50qbz ea 2 way 1i10ude ea 2 way 8d11ghr ea   Hang stretch   6i51tqf 6a27hap 8i24jsm     Row Cable 2x10 23# Cable 2x10 27# TRX 3x10 TRX 3x10 Bent 3x10 10# Bent 2x10 12#    Low Row         Pull down Cable 2x10 37# Cable 2x10 40#  Left Cable 2x10 17# Left Cable 2x10 17#     Bilateral ER 3x10 red        Pull apart 3x10 red        Wall FE SB over door 10x SB over door 10x       Fall out TRX 2x10 TRX 2x10 TRX 2x10 TRX 2x10 TRX 2x10 TRX 2x10   Press  TRX 2x10       Bent over  2x10 2# 3way  2x10 2# 2way  2x10 3# 2way   FE w/ ER   2x10 yellow SL 2x10 2# 2x10 blue    Punch w/ ER   2x10 yellow  2x10 blue    ER   2x10 yellow SL 3x10 3# Cable 2x10 3# 3x10 green   IR   2x10 Dbl yellow      Overhead press      NV   90/90 ER      TRX 2x10 yellow   Windmill      TRX 2x10 yellow                           MANUAL THERAPY: (0 minutes):   Joint mobilization and Soft tissue mobilization was utilized and necessary because of the patient's restricted joint motion, painful spasm, loss of articular motion, and restricted motion of soft tissue.         Date  11/27/2024     Technique Used Grade  Level # Time(s) Effect while being performed    right GH joint  inferior glide  II   2X 10     improve joint capsule   mobilization    right GH posterior glide  II    2X10   improve joint capsule   mobilization                                                                Strumming

## 2025-01-16 ENCOUNTER — HOSPITAL ENCOUNTER (OUTPATIENT)
Dept: PHYSICAL THERAPY | Age: 65
Setting detail: RECURRING SERIES
Discharge: HOME | End: 2025-01-19
Attending: STUDENT IN AN ORGANIZED HEALTH CARE EDUCATION/TRAINING PROGRAM
Payer: MEDICARE

## 2025-01-16 PROCEDURE — 97110 THERAPEUTIC EXERCISES: CPT

## 2025-01-16 NOTE — PROGRESS NOTES
Mariya Milner  : 1960  Primary: Medicare Part A And B (Medicare)  Secondary: AETNA SENIOR MEDICARE SUPP [facility] @ [place] [address] [address] [place] [address]  Phone: [phone]  Fax: [phone] Plan Frequency: 2x a week for 8weeks    Plan of Care/Certification Expiration Date: 25        Plan of Care/Certification Expiration Date:  Plan of Care/Certification Expiration Date: 25    Frequency/Duration:   Plan Frequency: 2x a week for 8weeks      Time In/Out:   Time In: 1300  Time Out: 1345      PT Visit Info:         Visit Count:  10    OUTPATIENT PHYSICAL THERAPY:   Treatment Note 2025       Episode  (Right shoulder pain, Ad Cap)               Treatment Diagnosis:    Right shoulder pain, unspecified chronicity  Decreased range of motion of shoulder, right  Medical/Referring Diagnosis:    Right shoulder pain, unspecified chronicity  Decreased range of motion of shoulder, right      Referring Physician:  Ana Ornelas MD MD Orders:  PT Eval and Treat     Date of Onset:  Onset Date:  (about 3 months ago)     Allergies:   Sulfa antibiotics  Restrictions/Precautions:   None      Interventions Planned (Treatment may consist of any combination of the following):     See Assessment Note    Subjective Comments: Patient reports continued progress with ROM    Initial Pain Level::   sore   /10  Post Session Pain Level:     sore   /10  Medications Last Reviewed:  2025    Updated Objective Findings:    None today          Treatment     Treatment   THERAPEUTIC EXERCISE: (40 minutes):    Exercises per grid below to improve mobility, strength, balance, coordination, and dynamic movement of generalized knee to improve functional bending, lifting, and carrying.  Required minimal visual, verbal, and manual cues to promote proper body alignment, promote proper body posture, and promote proper body mechanics.  Progressed resistance, range, repetitions, and

## 2025-01-21 ENCOUNTER — HOSPITAL ENCOUNTER (OUTPATIENT)
Dept: PHYSICAL THERAPY | Age: 65
Setting detail: RECURRING SERIES
Discharge: HOME | End: 2025-01-24
Attending: STUDENT IN AN ORGANIZED HEALTH CARE EDUCATION/TRAINING PROGRAM
Payer: MEDICARE

## 2025-01-21 PROCEDURE — 97110 THERAPEUTIC EXERCISES: CPT

## 2025-01-21 NOTE — PROGRESS NOTES
Strumming and sustained pressure on right pectoralis to reduce muscle spasms   Strumming right and release technique to right upper trap       Treatment/Session Summary:    Treatment Assessment: Patient remains limited with horizontal Abd and ER    Communication/Consultation:  None  Equipment provided today:  HEP  Recommendations/Intent for next treatment session: Next visit will focus on ROM and progression of postural strengthening.    >Total Treatment Billable Duration:  40 minutes   Time In: 1300  Time Out: 1345    Pablo Aldrich PT         Charge Capture  Events  [employer] Portal  Appt Desk  Attendance Report     Future Appointments   Date Time Provider Department Center   1/23/2025  1:00 PM Pablo Aldrich, PT [facility]   1/28/2025  1:00 PM Pablo Aldrich, PT [facility]   1/30/2025  1:00 PM Pablo Aldrich, PT [facility]   8/13/2025  8:00 AM Sha Uriarte DO [facility] AMB   10/10/2025  8:40 AM [facility] LAB RESOURCE [facility] ECC DEP   10/17/2025 10:00 AM Shyanne Gomez MD [facility] DEP

## 2025-01-23 ENCOUNTER — HOSPITAL ENCOUNTER (OUTPATIENT)
Dept: PHYSICAL THERAPY | Age: 65
Setting detail: RECURRING SERIES
Discharge: HOME | End: 2025-01-26
Attending: STUDENT IN AN ORGANIZED HEALTH CARE EDUCATION/TRAINING PROGRAM
Payer: MEDICARE

## 2025-01-23 PROCEDURE — 97110 THERAPEUTIC EXERCISES: CPT

## 2025-01-23 NOTE — PROGRESS NOTES
Mariya Milner  : 1960  Primary: Medicare Part A And B (Medicare)  Secondary: AETNA SENIOR MEDICARE SUPP [facility] @ [place] [address] [address] [place] [address]  Phone: [phone]  Fax: [phone] Plan Frequency: 2x a week for 8weeks    Plan of Care/Certification Expiration Date: 25        Plan of Care/Certification Expiration Date:  Plan of Care/Certification Expiration Date: 25    Frequency/Duration:   Plan Frequency: 2x a week for 8weeks      Time In/Out:   Time In: 1300  Time Out: 1345      PT Visit Info:         Visit Count:  12    OUTPATIENT PHYSICAL THERAPY:   Treatment Note 2025       Episode  (Right shoulder pain, Ad Cap)               Treatment Diagnosis:    Right shoulder pain, unspecified chronicity  Decreased range of motion of shoulder, right  Medical/Referring Diagnosis:    Right shoulder pain, unspecified chronicity  Decreased range of motion of shoulder, right      Referring Physician:  Ana Ornelas MD MD Orders:  PT Eval and Treat     Date of Onset:  Onset Date:  (about 3 months ago)     Allergies:   Sulfa antibiotics  Restrictions/Precautions:   None      Interventions Planned (Treatment may consist of any combination of the following):     See Assessment Note    Subjective Comments: Patient reports doing so much better    Initial Pain Level::   sore   /10  Post Session Pain Level:     sore   /10  Medications Last Reviewed:  2025    Updated Objective Findings:    None today          Treatment     Treatment   THERAPEUTIC EXERCISE: (40 minutes):    Exercises per grid below to improve mobility, strength, balance, coordination, and dynamic movement of generalized knee to improve functional bending, lifting, and carrying.  Required minimal visual, verbal, and manual cues to promote proper body alignment, promote proper body posture, and promote proper body mechanics.  Progressed resistance, range, repetitions, and complexity

## 2025-01-28 ENCOUNTER — HOSPITAL ENCOUNTER (OUTPATIENT)
Dept: PHYSICAL THERAPY | Age: 65
Setting detail: RECURRING SERIES
Discharge: HOME | End: 2025-01-31
Attending: STUDENT IN AN ORGANIZED HEALTH CARE EDUCATION/TRAINING PROGRAM
Payer: MEDICARE

## 2025-01-28 PROCEDURE — 97110 THERAPEUTIC EXERCISES: CPT

## 2025-01-28 NOTE — PROGRESS NOTES
Mariya Milner  : 1960  Primary: Medicare Part A And B (Medicare)  Secondary: AETNA SENIOR MEDICARE SUPP [facility] @ [place] [address] [place] [address]  Phone: [phone]  Fax: [phone] Plan Frequency: 2x a week for 8weeks    Plan of Care/Certification Expiration Date: 25        Plan of Care/Certification Expiration Date:  Plan of Care/Certification Expiration Date: 25    Frequency/Duration:   Plan Frequency: 2x a week for 8weeks      Time In/Out:   Time In: 1300  Time Out: 1345      PT Visit Info:         Visit Count:  13    OUTPATIENT PHYSICAL THERAPY:   Treatment Note 2025       Episode  (Right shoulder pain, Ad Cap)               Treatment Diagnosis:    Right shoulder pain, unspecified chronicity  Decreased range of motion of shoulder, right  Medical/Referring Diagnosis:    Right shoulder pain, unspecified chronicity  Decreased range of motion of shoulder, right      Referring Physician:  Ana Ornelas MD MD Orders:  PT Eval and Treat     Date of Onset:  Onset Date:  (about 3 months ago)     Allergies:   Sulfa antibiotics  Restrictions/Precautions:   None      Interventions Planned (Treatment may consist of any combination of the following):     See Assessment Note    Subjective Comments: Patient continued improvement    Initial Pain Level::   sore   /10  Post Session Pain Level:     sore   /10  Medications Last Reviewed:  2025    Updated Objective Findings:    None today          Treatment     Treatment   THERAPEUTIC EXERCISE: (40 minutes):    Exercises per grid below to improve mobility, strength, balance, coordination, and dynamic movement of generalized knee to improve functional bending, lifting, and carrying.  Required minimal visual, verbal, and manual cues to promote proper body alignment, promote proper body posture, and promote proper body mechanics.  Progressed resistance, range, repetitions, and complexity of

## 2025-01-30 ENCOUNTER — HOSPITAL ENCOUNTER (OUTPATIENT)
Dept: PHYSICAL THERAPY | Age: 65
Setting detail: RECURRING SERIES
End: 2025-01-30
Attending: STUDENT IN AN ORGANIZED HEALTH CARE EDUCATION/TRAINING PROGRAM
Payer: MEDICARE

## 2025-01-30 PROCEDURE — 97110 THERAPEUTIC EXERCISES: CPT

## 2025-01-30 NOTE — THERAPY DISCHARGE
Mariya Milner  : 1960  Primary: Medicare Part A And B (Medicare)  Secondary: AETNA SENIOR MEDICARE SUPP [facility] @ [place] [address] [address] [place] [address]  Phone: [phone]  Fax: [phone] Plan Frequency: 2x a week for 8weeks  Plan of Care/Certification Expiration Date: 25        Plan of Care/Certification Expiration Date:  Plan of Care/Certification Expiration Date: 25    Frequency/Duration: Plan Frequency: 2x a week for 8weeks      Time In/Out:   Time In: 1300  Time Out: 1345    PT Visit Info:    Plan Frequency: 2x a week for 8weeks      Visit Count:  14                OUTPATIENT PHYSICAL THERAPY:             Discharge Summary 2025                 Episode (Right shoulder pain, Ad Cap)         Treatment Diagnosis:     No data found  Medical/Referring Diagnosis:    Right shoulder pain, unspecified chronicity [M25.511]  Decreased range of motion of shoulder, right [M25.611]      Referring Physician:  Ana Ornelas MD MD Orders:  PT Eval and Treat   Return MD Appt:  2025  Date of Onset:  Onset Date:  (about 3 months ago)     Allergies:  Sulfa antibiotics  Restrictions/Precautions:    None      Medications Last Reviewed:  2025     SUBJECTIVE   History of Injury/Illness (Reason for Referral):  Patient reports insidious on set of right shoulder pain and loss of ROM about 3 months ago. She states that she had the same adhesive capsulitis in same shoulder in  requiring 30+ visits of PT.  Patient Stated Goal(s):  \"Normal use of arm without pain\"  Initial Pain Level:      /10   Post Session Pain Level:      /10  Past Medical History/Comorbidities:   Ms. Milner  has a past medical history of Hypercholesterolemia and Hypertension.  Ms. Milner  has a past surgical history that includes gyn and  section (9/15/87).  Social History/Living Environment:   Patient lives with their spouse  Type of Home: House: One Story    Level of

## 2025-01-30 NOTE — PROGRESS NOTES
Mariya Milner  : 1960  Primary: Medicare Part A And B (Medicare)  Secondary: AETNA SENIOR MEDICARE SUPP [facility] @ [place] [address] [address] [place] [address]  Phone: [phone]  Fax: [phone] Plan Frequency: 2x a week for 8weeks    Plan of Care/Certification Expiration Date: 25        Plan of Care/Certification Expiration Date:  Plan of Care/Certification Expiration Date: 25    Frequency/Duration:   Plan Frequency: 2x a week for 8weeks      Time In/Out:   Time In: 1300  Time Out: 1345      PT Visit Info:         Visit Count:  14    OUTPATIENT PHYSICAL THERAPY:   Treatment Note 2025       Episode  (Right shoulder pain, Ad Cap)               Treatment Diagnosis:    Right shoulder pain, unspecified chronicity  Decreased range of motion of shoulder, right  Medical/Referring Diagnosis:    Right shoulder pain, unspecified chronicity  Decreased range of motion of shoulder, right      Referring Physician:  Ana Ornelas MD MD Orders:  PT Eval and Treat     Date of Onset:  Onset Date:  (about 3 months ago)     Allergies:   Sulfa antibiotics  Restrictions/Precautions:   None      Interventions Planned (Treatment may consist of any combination of the following):     See Assessment Note    Subjective Comments: See Discharge    Initial Pain Level::   0   /10  Post Session Pain Level:     0   /10  Medications Last Reviewed:  2025    Updated Objective Findings:    See Discharge          Treatment     Treatment   THERAPEUTIC EXERCISE: (40 minutes):    Exercises per grid below to improve mobility, strength, balance, coordination, and dynamic movement of generalized knee to improve functional bending, lifting, and carrying.  Required minimal visual, verbal, and manual cues to promote proper body alignment, promote proper body posture, and promote proper body mechanics.  Progressed resistance, range, repetitions, and complexity of movement as

## 2025-04-24 DIAGNOSIS — E78.5 HYPERLIPIDEMIA, UNSPECIFIED HYPERLIPIDEMIA TYPE: ICD-10-CM

## 2025-04-24 RX ORDER — SIMVASTATIN 10 MG
10 TABLET ORAL NIGHTLY
Qty: 90 TABLET | Refills: 3 | Status: SHIPPED | OUTPATIENT
Start: 2025-04-24

## 2025-05-27 DIAGNOSIS — I10 PRIMARY HYPERTENSION: ICD-10-CM

## 2025-05-27 RX ORDER — HYDROCHLOROTHIAZIDE 25 MG/1
TABLET ORAL
Qty: 90 TABLET | Refills: 3 | Status: SHIPPED | OUTPATIENT
Start: 2025-05-27

## 2025-06-25 DIAGNOSIS — E03.9 ACQUIRED HYPOTHYROIDISM: ICD-10-CM

## 2025-06-25 RX ORDER — LEVOTHYROXINE SODIUM 50 UG/1
50 TABLET ORAL DAILY
Qty: 90 TABLET | Refills: 3 | Status: SHIPPED | OUTPATIENT
Start: 2025-06-25

## 2025-08-13 ENCOUNTER — OFFICE VISIT (OUTPATIENT)
Dept: NEUROLOGY | Age: 65
End: 2025-08-13
Payer: MEDICARE

## 2025-08-13 DIAGNOSIS — F02.A0 MILD ALZHEIMER'S DEMENTIA OF OTHER ONSET, WITHOUT BEHAVIORAL DISTURBANCE, PSYCHOTIC DISTURBANCE, MOOD DISTURBANCE, OR ANXIETY (HCC): Primary | ICD-10-CM

## 2025-08-13 DIAGNOSIS — G30.8 MILD ALZHEIMER'S DEMENTIA OF OTHER ONSET, WITHOUT BEHAVIORAL DISTURBANCE, PSYCHOTIC DISTURBANCE, MOOD DISTURBANCE, OR ANXIETY (HCC): Primary | ICD-10-CM

## 2025-08-13 PROCEDURE — 3017F COLORECTAL CA SCREEN DOC REV: CPT | Performed by: PSYCHIATRY & NEUROLOGY

## 2025-08-13 PROCEDURE — 1036F TOBACCO NON-USER: CPT | Performed by: PSYCHIATRY & NEUROLOGY

## 2025-08-13 PROCEDURE — 1090F PRES/ABSN URINE INCON ASSESS: CPT | Performed by: PSYCHIATRY & NEUROLOGY

## 2025-08-13 PROCEDURE — G8427 DOCREV CUR MEDS BY ELIG CLIN: HCPCS | Performed by: PSYCHIATRY & NEUROLOGY

## 2025-08-13 PROCEDURE — G8399 PT W/DXA RESULTS DOCUMENT: HCPCS | Performed by: PSYCHIATRY & NEUROLOGY

## 2025-08-13 PROCEDURE — 1160F RVW MEDS BY RX/DR IN RCRD: CPT | Performed by: PSYCHIATRY & NEUROLOGY

## 2025-08-13 PROCEDURE — 99214 OFFICE O/P EST MOD 30 MIN: CPT | Performed by: PSYCHIATRY & NEUROLOGY

## 2025-08-13 PROCEDURE — G8417 CALC BMI ABV UP PARAM F/U: HCPCS | Performed by: PSYCHIATRY & NEUROLOGY

## 2025-08-13 PROCEDURE — 1123F ACP DISCUSS/DSCN MKR DOCD: CPT | Performed by: PSYCHIATRY & NEUROLOGY

## 2025-08-13 PROCEDURE — 1159F MED LIST DOCD IN RCRD: CPT | Performed by: PSYCHIATRY & NEUROLOGY

## 2025-08-13 RX ORDER — MEMANTINE HYDROCHLORIDE 10 MG/1
10 TABLET ORAL 2 TIMES DAILY
Qty: 180 TABLET | Refills: 3 | Status: SHIPPED | OUTPATIENT
Start: 2025-08-13

## 2025-08-13 RX ORDER — DONEPEZIL HYDROCHLORIDE 10 MG/1
10 TABLET, FILM COATED ORAL NIGHTLY
Qty: 90 TABLET | Refills: 3 | Status: SHIPPED | OUTPATIENT
Start: 2025-08-13

## 2025-08-13 ASSESSMENT — ENCOUNTER SYMPTOMS
GASTROINTESTINAL NEGATIVE: 1
EYES NEGATIVE: 1
RESPIRATORY NEGATIVE: 1
ALLERGIC/IMMUNOLOGIC NEGATIVE: 1